# Patient Record
Sex: MALE | Race: WHITE | NOT HISPANIC OR LATINO | Employment: OTHER | URBAN - METROPOLITAN AREA
[De-identification: names, ages, dates, MRNs, and addresses within clinical notes are randomized per-mention and may not be internally consistent; named-entity substitution may affect disease eponyms.]

---

## 2020-02-29 ENCOUNTER — HOSPITAL ENCOUNTER (EMERGENCY)
Facility: HOSPITAL | Age: 85
Discharge: ANOTHER HOSPITAL | End: 2020-02-29
Attending: EMERGENCY MEDICINE
Payer: MEDICARE

## 2020-02-29 ENCOUNTER — OFFICE VISIT (OUTPATIENT)
Dept: URGENT CARE | Facility: CLINIC | Age: 85
End: 2020-02-29
Payer: MEDICARE

## 2020-02-29 ENCOUNTER — HOSPITAL ENCOUNTER (OUTPATIENT)
Facility: HOSPITAL | Age: 85
LOS: 1 days | Discharge: HOME | End: 2020-03-03
Attending: HOSPITALIST | Admitting: HOSPITALIST
Payer: MEDICARE

## 2020-02-29 VITALS
SYSTOLIC BLOOD PRESSURE: 138 MMHG | OXYGEN SATURATION: 97 % | HEART RATE: 62 BPM | BODY MASS INDEX: 30.31 KG/M2 | DIASTOLIC BLOOD PRESSURE: 72 MMHG | WEIGHT: 200 LBS | TEMPERATURE: 98 F | HEIGHT: 68 IN | RESPIRATION RATE: 18 BRPM

## 2020-02-29 VITALS
BODY MASS INDEX: 30.31 KG/M2 | WEIGHT: 200 LBS | RESPIRATION RATE: 18 BRPM | TEMPERATURE: 99 F | HEART RATE: 68 BPM | DIASTOLIC BLOOD PRESSURE: 64 MMHG | HEIGHT: 68 IN | OXYGEN SATURATION: 99 % | SYSTOLIC BLOOD PRESSURE: 165 MMHG

## 2020-02-29 DIAGNOSIS — I63.9 STROKE: ICD-10-CM

## 2020-02-29 DIAGNOSIS — R29.818 TRANSIENT NEUROLOGICAL SYMPTOMS: ICD-10-CM

## 2020-02-29 DIAGNOSIS — R41.0 TRANSIENT DISORIENTATION: ICD-10-CM

## 2020-02-29 DIAGNOSIS — R29.90 STROKE-LIKE SYMPTOMS: ICD-10-CM

## 2020-02-29 DIAGNOSIS — G45.9 TIA (TRANSIENT ISCHEMIC ATTACK): ICD-10-CM

## 2020-02-29 DIAGNOSIS — R47.81 SLURRED SPEECH: Primary | ICD-10-CM

## 2020-02-29 DIAGNOSIS — R53.1 WEAKNESS: ICD-10-CM

## 2020-02-29 PROBLEM — I10 HYPERTENSION, BENIGN: Status: ACTIVE | Noted: 2020-02-29

## 2020-02-29 LAB
ALBUMIN SERPL BCP-MCNC: 3.6 G/DL (ref 3.5–5.2)
ALP SERPL-CCNC: 81 U/L (ref 55–135)
ALT SERPL W/O P-5'-P-CCNC: 13 U/L (ref 10–44)
ANION GAP SERPL CALC-SCNC: 8 MMOL/L (ref 8–16)
APTT BLDCRRT: 30.5 SEC (ref 21–32)
AST SERPL-CCNC: 19 U/L (ref 10–40)
BASOPHILS # BLD AUTO: 0.02 K/UL (ref 0–0.2)
BASOPHILS NFR BLD: 0.4 % (ref 0–1.9)
BILIRUB SERPL-MCNC: 1.3 MG/DL (ref 0.1–1)
BILIRUB UR QL STRIP: NEGATIVE
BNP SERPL-MCNC: 137 PG/ML (ref 0–99)
BUN SERPL-MCNC: 17 MG/DL (ref 8–23)
CALCIUM SERPL-MCNC: 9.3 MG/DL (ref 8.7–10.5)
CHLORIDE SERPL-SCNC: 103 MMOL/L (ref 95–110)
CHOLEST SERPL-MCNC: 166 MG/DL (ref 120–199)
CHOLEST/HDLC SERPL: 4.5 {RATIO} (ref 2–5)
CK MB SERPL-MCNC: 1.4 NG/ML (ref 0.1–6.5)
CK MB SERPL-RTO: 1.5 % (ref 0–5)
CK SERPL-CCNC: 91 U/L (ref 20–200)
CK SERPL-CCNC: 91 U/L (ref 20–200)
CLARITY UR: CLEAR
CO2 SERPL-SCNC: 30 MMOL/L (ref 23–29)
COLOR UR: YELLOW
CREAT SERPL-MCNC: 0.9 MG/DL (ref 0.5–1.4)
DIFFERENTIAL METHOD: ABNORMAL
EOSINOPHIL # BLD AUTO: 0 K/UL (ref 0–0.5)
EOSINOPHIL NFR BLD: 0.4 % (ref 0–8)
ERYTHROCYTE [DISTWIDTH] IN BLOOD BY AUTOMATED COUNT: 12.7 % (ref 11.5–14.5)
EST. GFR  (AFRICAN AMERICAN): >60 ML/MIN/1.73 M^2
EST. GFR  (NON AFRICAN AMERICAN): >60 ML/MIN/1.73 M^2
ESTIMATED AVG GLUCOSE: 120 MG/DL (ref 68–131)
GLUCOSE SERPL-MCNC: 114 MG/DL (ref 70–110)
GLUCOSE UR QL STRIP: NEGATIVE
HBA1C MFR BLD HPLC: 5.8 % (ref 4–5.6)
HCT VFR BLD AUTO: 45.7 % (ref 40–54)
HDLC SERPL-MCNC: 37 MG/DL (ref 40–75)
HDLC SERPL: 22.3 % (ref 20–50)
HGB BLD-MCNC: 14.9 G/DL (ref 14–18)
HGB UR QL STRIP: NEGATIVE
IMM GRANULOCYTES # BLD AUTO: 0.01 K/UL (ref 0–0.04)
IMM GRANULOCYTES NFR BLD AUTO: 0.2 % (ref 0–0.5)
INFLUENZA A, MOLECULAR: NEGATIVE
INFLUENZA B, MOLECULAR: NEGATIVE
INR PPP: 1 (ref 0.8–1.2)
KETONES UR QL STRIP: NEGATIVE
LDLC SERPL CALC-MCNC: 96.8 MG/DL (ref 63–159)
LEUKOCYTE ESTERASE UR QL STRIP: NEGATIVE
LYMPHOCYTES # BLD AUTO: 0.7 K/UL (ref 1–4.8)
LYMPHOCYTES NFR BLD: 15.5 % (ref 18–48)
MAGNESIUM SERPL-MCNC: 1.9 MG/DL (ref 1.6–2.6)
MCH RBC QN AUTO: 28.8 PG (ref 27–31)
MCHC RBC AUTO-ENTMCNC: 32.6 G/DL (ref 32–36)
MCV RBC AUTO: 88 FL (ref 82–98)
MONOCYTES # BLD AUTO: 0.7 K/UL (ref 0.3–1)
MONOCYTES NFR BLD: 13.7 % (ref 4–15)
NEUTROPHILS # BLD AUTO: 3.3 K/UL (ref 1.8–7.7)
NEUTROPHILS NFR BLD: 69.8 % (ref 38–73)
NITRITE UR QL STRIP: NEGATIVE
NONHDLC SERPL-MCNC: 129 MG/DL
NRBC BLD-RTO: 0 /100 WBC
PH UR STRIP: 6 [PH] (ref 5–8)
PHOSPHATE SERPL-MCNC: 2.5 MG/DL (ref 2.7–4.5)
PLATELET # BLD AUTO: 151 K/UL (ref 150–350)
PMV BLD AUTO: 10.7 FL (ref 9.2–12.9)
POTASSIUM SERPL-SCNC: 4.2 MMOL/L (ref 3.5–5.1)
PROT SERPL-MCNC: 7.4 G/DL (ref 6–8.4)
PROT UR QL STRIP: NEGATIVE
PROTHROMBIN TIME: 10.9 SEC (ref 9–12.5)
RBC # BLD AUTO: 5.17 M/UL (ref 4.6–6.2)
SODIUM SERPL-SCNC: 141 MMOL/L (ref 136–145)
SP GR UR STRIP: 1.02 (ref 1–1.03)
SPECIMEN SOURCE: NORMAL
TRIGL SERPL-MCNC: 161 MG/DL (ref 30–150)
TROPONIN I SERPL DL<=0.01 NG/ML-MCNC: 0.02 NG/ML (ref 0–0.03)
TSH SERPL DL<=0.005 MIU/L-ACNC: 1.94 UIU/ML (ref 0.4–4)
URN SPEC COLLECT METH UR: NORMAL
UROBILINOGEN UR STRIP-ACNC: NEGATIVE EU/DL
WBC # BLD AUTO: 4.76 K/UL (ref 3.9–12.7)

## 2020-02-29 PROCEDURE — 85610 PROTHROMBIN TIME: CPT

## 2020-02-29 PROCEDURE — 93005 ELECTROCARDIOGRAM TRACING: CPT

## 2020-02-29 PROCEDURE — 81003 URINALYSIS AUTO W/O SCOPE: CPT

## 2020-02-29 PROCEDURE — 86592 SYPHILIS TEST NON-TREP QUAL: CPT

## 2020-02-29 PROCEDURE — 82553 CREATINE MB FRACTION: CPT

## 2020-02-29 PROCEDURE — 93010 EKG 12-LEAD: ICD-10-PCS | Mod: ,,, | Performed by: INTERNAL MEDICINE

## 2020-02-29 PROCEDURE — 85025 COMPLETE CBC W/AUTO DIFF WBC: CPT

## 2020-02-29 PROCEDURE — 99285 EMERGENCY DEPT VISIT HI MDM: CPT | Mod: 25

## 2020-02-29 PROCEDURE — G0426 PR INPT TELEHEALTH CONSULT 50M: ICD-10-PCS | Mod: 95,,, | Performed by: PSYCHIATRY & NEUROLOGY

## 2020-02-29 PROCEDURE — 82550 ASSAY OF CK (CPK): CPT

## 2020-02-29 PROCEDURE — G0379 DIRECT REFER HOSPITAL OBSERV: HCPCS

## 2020-02-29 PROCEDURE — 83735 ASSAY OF MAGNESIUM: CPT

## 2020-02-29 PROCEDURE — 99203 OFFICE O/P NEW LOW 30 MIN: CPT | Mod: S$GLB,,, | Performed by: PHYSICIAN ASSISTANT

## 2020-02-29 PROCEDURE — 84484 ASSAY OF TROPONIN QUANT: CPT

## 2020-02-29 PROCEDURE — 96372 THER/PROPH/DIAG INJ SC/IM: CPT | Mod: 59 | Performed by: HOSPITALIST

## 2020-02-29 PROCEDURE — 83880 ASSAY OF NATRIURETIC PEPTIDE: CPT

## 2020-02-29 PROCEDURE — 85730 THROMBOPLASTIN TIME PARTIAL: CPT

## 2020-02-29 PROCEDURE — 84443 ASSAY THYROID STIM HORMONE: CPT

## 2020-02-29 PROCEDURE — 82607 VITAMIN B-12: CPT

## 2020-02-29 PROCEDURE — 83036 HEMOGLOBIN GLYCOSYLATED A1C: CPT

## 2020-02-29 PROCEDURE — 99203 PR OFFICE/OUTPT VISIT, NEW, LEVL III, 30-44 MIN: ICD-10-PCS | Mod: S$GLB,,, | Performed by: PHYSICIAN ASSISTANT

## 2020-02-29 PROCEDURE — G0378 HOSPITAL OBSERVATION PER HR: HCPCS

## 2020-02-29 PROCEDURE — 80053 COMPREHEN METABOLIC PANEL: CPT

## 2020-02-29 PROCEDURE — 25000003 PHARM REV CODE 250: Performed by: NURSE PRACTITIONER

## 2020-02-29 PROCEDURE — 94761 N-INVAS EAR/PLS OXIMETRY MLT: CPT

## 2020-02-29 PROCEDURE — 84100 ASSAY OF PHOSPHORUS: CPT

## 2020-02-29 PROCEDURE — G0426 INPT/ED TELECONSULT50: HCPCS | Mod: 95,,, | Performed by: PSYCHIATRY & NEUROLOGY

## 2020-02-29 PROCEDURE — 36415 COLL VENOUS BLD VENIPUNCTURE: CPT

## 2020-02-29 PROCEDURE — 63600175 PHARM REV CODE 636 W HCPCS: Performed by: HOSPITALIST

## 2020-02-29 PROCEDURE — 93010 ELECTROCARDIOGRAM REPORT: CPT | Mod: ,,, | Performed by: INTERNAL MEDICINE

## 2020-02-29 PROCEDURE — 82746 ASSAY OF FOLIC ACID SERUM: CPT

## 2020-02-29 PROCEDURE — 80061 LIPID PANEL: CPT

## 2020-02-29 PROCEDURE — 87502 INFLUENZA DNA AMP PROBE: CPT

## 2020-02-29 RX ORDER — LOPERAMIDE HYDROCHLORIDE 2 MG/1
4 CAPSULE ORAL ONCE
Status: DISCONTINUED | OUTPATIENT
Start: 2020-02-29 | End: 2020-02-29

## 2020-02-29 RX ORDER — ACETAMINOPHEN 325 MG/1
650 TABLET ORAL EVERY 6 HOURS PRN
Status: DISCONTINUED | OUTPATIENT
Start: 2020-02-29 | End: 2020-03-03 | Stop reason: HOSPADM

## 2020-02-29 RX ORDER — LABETALOL HYDROCHLORIDE 5 MG/ML
10 INJECTION, SOLUTION INTRAVENOUS EVERY 6 HOURS PRN
Status: DISCONTINUED | OUTPATIENT
Start: 2020-02-29 | End: 2020-03-03 | Stop reason: HOSPADM

## 2020-02-29 RX ORDER — ATORVASTATIN CALCIUM 40 MG/1
40 TABLET, FILM COATED ORAL DAILY
Status: DISCONTINUED | OUTPATIENT
Start: 2020-03-01 | End: 2020-02-29

## 2020-02-29 RX ORDER — METOPROLOL TARTRATE 1 MG/ML
5 INJECTION, SOLUTION INTRAVENOUS EVERY 5 MIN PRN
Status: DISCONTINUED | OUTPATIENT
Start: 2020-02-29 | End: 2020-02-29

## 2020-02-29 RX ORDER — SODIUM CHLORIDE 0.9 % (FLUSH) 0.9 %
10 SYRINGE (ML) INJECTION
Status: DISCONTINUED | OUTPATIENT
Start: 2020-02-29 | End: 2020-03-03 | Stop reason: HOSPADM

## 2020-02-29 RX ORDER — ENOXAPARIN SODIUM 100 MG/ML
40 INJECTION SUBCUTANEOUS EVERY 24 HOURS
Status: DISCONTINUED | OUTPATIENT
Start: 2020-02-29 | End: 2020-03-03 | Stop reason: HOSPADM

## 2020-02-29 RX ORDER — ROSUVASTATIN CALCIUM 10 MG/1
20 TABLET, COATED ORAL DAILY
Status: DISCONTINUED | OUTPATIENT
Start: 2020-03-01 | End: 2020-02-29

## 2020-02-29 RX ORDER — DOCUSATE SODIUM 100 MG/1
100 CAPSULE, LIQUID FILLED ORAL DAILY
Status: DISCONTINUED | OUTPATIENT
Start: 2020-03-01 | End: 2020-03-03 | Stop reason: HOSPADM

## 2020-02-29 RX ORDER — PANTOPRAZOLE SODIUM 40 MG/1
40 TABLET, DELAYED RELEASE ORAL DAILY
Status: DISCONTINUED | OUTPATIENT
Start: 2020-03-01 | End: 2020-03-03 | Stop reason: HOSPADM

## 2020-02-29 RX ORDER — ATENOLOL 50 MG/1
50 TABLET ORAL DAILY
COMMUNITY
Start: 2019-11-26

## 2020-02-29 RX ORDER — ASPIRIN 81 MG/1
81 TABLET ORAL DAILY
Status: DISCONTINUED | OUTPATIENT
Start: 2020-03-01 | End: 2020-02-29 | Stop reason: SDUPTHER

## 2020-02-29 RX ORDER — ASPIRIN 81 MG/1
81 TABLET ORAL DAILY
Status: DISCONTINUED | OUTPATIENT
Start: 2020-03-01 | End: 2020-03-03 | Stop reason: HOSPADM

## 2020-02-29 RX ORDER — ONDANSETRON 2 MG/ML
4 INJECTION INTRAMUSCULAR; INTRAVENOUS EVERY 8 HOURS PRN
Status: DISCONTINUED | OUTPATIENT
Start: 2020-02-29 | End: 2020-03-03 | Stop reason: HOSPADM

## 2020-02-29 RX ORDER — NAPROXEN SODIUM 220 MG/1
81 TABLET, FILM COATED ORAL
Status: COMPLETED | OUTPATIENT
Start: 2020-02-29 | End: 2020-02-29

## 2020-02-29 RX ORDER — POLYETHYLENE GLYCOL 3350 17 G/17G
17 POWDER, FOR SOLUTION ORAL DAILY
Status: DISCONTINUED | OUTPATIENT
Start: 2020-03-01 | End: 2020-03-03 | Stop reason: HOSPADM

## 2020-02-29 RX ORDER — CLOPIDOGREL BISULFATE 75 MG/1
75 TABLET ORAL DAILY
Status: DISCONTINUED | OUTPATIENT
Start: 2020-03-01 | End: 2020-02-29

## 2020-02-29 RX ORDER — DOCUSATE SODIUM 100 MG/1
100 CAPSULE, LIQUID FILLED ORAL DAILY
COMMUNITY

## 2020-02-29 RX ADMIN — ASPIRIN 81 MG 81 MG: 81 TABLET ORAL at 01:02

## 2020-02-29 RX ADMIN — ENOXAPARIN SODIUM 40 MG: 100 INJECTION SUBCUTANEOUS at 09:02

## 2020-02-29 RX ADMIN — ACETAMINOPHEN 650 MG: 325 TABLET ORAL at 09:02

## 2020-02-29 NOTE — ED PROVIDER NOTES
Encounter Date: 2/29/2020       History     Chief Complaint   Patient presents with    bout of slurred speech     Papo Chopra is a 84 y.o. male with PMH of hypertension who was referred by urgent care for evaluation of slurred speech, possible TIA.  Patient presents with daughter who reports that she initially noticed symptoms yesterday. She reports weakness, disorientation yesterday that resolved. He awoke this AM at approximately 0730 with noticeable slurring of speech and disorientation per daughter reports lasting approximately 3 min.; he then fell asleep again in which she woke him up and noticed that he still had slurring of speech. She brought him to urgent care to be evaluated for a possible stroke because patient refused to come to the ED. He presented to urgent care with completely resolved symptoms; after eval he was immediately referred to the ED for possible stroke vs TIA.   He presents to the ED without symptoms, normal neuro exam. He reports generalized weakness yesterday; denies headache, dizziness, lightheadedness, or facial tingling.  He denies chest pain or shortness of breath.  He denies heart palpitations or feelings of syncope.  He does not endorse fever, chills, or body aches.  He denies cough or recent URI.  He denies urinary symptoms.    The history is provided by the patient.     Review of patient's allergies indicates:  No Known Allergies  Past Medical History:   Diagnosis Date    Hypertension      History reviewed. No pertinent surgical history.  Family History   Problem Relation Age of Onset    No Known Problems Mother     No Known Problems Father      Social History     Tobacco Use    Smoking status: Never Smoker   Substance Use Topics    Alcohol use: Not Currently    Drug use: Never     Review of Systems   Constitutional: Negative.  Negative for appetite change, chills and fever.   HENT: Negative.  Negative for congestion, ear discharge, ear pain, postnasal drip,  rhinorrhea and sore throat.    Eyes: Negative.    Respiratory: Negative.  Negative for cough, chest tightness and shortness of breath.    Cardiovascular: Negative.  Negative for chest pain.   Gastrointestinal: Negative.  Negative for abdominal distention, abdominal pain and nausea.   Endocrine: Negative.    Genitourinary: Negative.  Negative for dysuria, flank pain, hematuria and urgency.   Musculoskeletal: Negative.  Negative for arthralgias and back pain.   Skin: Negative.  Negative for rash.   Allergic/Immunologic: Negative.    Neurological: Positive for speech difficulty (hx of slurred speech; no resolved) and weakness. Negative for dizziness, tremors, seizures, syncope, facial asymmetry, light-headedness, numbness and headaches.   Hematological: Negative.  Does not bruise/bleed easily.   Psychiatric/Behavioral: Positive for confusion (disorientation per daughter report; no resolved).       Physical Exam     Initial Vitals [02/29/20 1030]   BP Pulse Resp Temp SpO2   (!) 160/68 64 16 98 °F (36.7 °C) 98 %      MAP       --         Physical Exam    Nursing note and vitals reviewed.  Constitutional: He appears well-developed and well-nourished.   HENT:   Head: Normocephalic and atraumatic.   Right Ear: External ear normal.   Left Ear: External ear normal.   Nose: Nose normal.   Mouth/Throat: Oropharynx is clear and moist. No oropharyngeal exudate.   Eyes: Conjunctivae and EOM are normal. Pupils are equal, round, and reactive to light.   Neck: Normal range of motion. Neck supple.   Cardiovascular: Normal rate, regular rhythm, normal heart sounds and intact distal pulses.   Pulmonary/Chest: Breath sounds normal.   Abdominal: Soft. Bowel sounds are normal.   Musculoskeletal: Normal range of motion.   Neurological: He is alert and oriented to person, place, and time. He has normal strength and normal reflexes. No cranial nerve deficit or sensory deficit. GCS eye subscore is 4. GCS verbal subscore is 5. GCS motor  subscore is 6.   Skin: Skin is warm and dry.   Psychiatric: He has a normal mood and affect. His behavior is normal. Judgment and thought content normal.         ED Course   Procedures  Labs Reviewed   CBC W/ AUTO DIFFERENTIAL - Abnormal; Notable for the following components:       Result Value    Lymph # 0.7 (*)     Lymph% 15.5 (*)     All other components within normal limits   COMPREHENSIVE METABOLIC PANEL - Abnormal; Notable for the following components:    CO2 30 (*)     Glucose 114 (*)     Total Bilirubin 1.3 (*)     All other components within normal limits   B-TYPE NATRIURETIC PEPTIDE - Abnormal; Notable for the following components:     (*)     All other components within normal limits   PHOSPHORUS - Abnormal; Notable for the following components:    Phosphorus 2.5 (*)     All other components within normal limits   INFLUENZA A & B BY MOLECULAR   URINALYSIS, REFLEX TO URINE CULTURE    Narrative:     Preferred Collection Type->Urine, Clean Catch   PROTIME-INR   TSH   APTT   TROPONIN I   CK   CK-MB   MAGNESIUM   POCT GLUCOSE, HAND-HELD DEVICE          Imaging Results          X-Ray Chest 1 View (Final result)  Result time 02/29/20 11:12:42   Procedure changed from X-Ray Chest PA And Lateral     Final result by Balaji Simpson MD (02/29/20 11:12:42)                 Impression:      Negative chest.  No significant change.      Electronically signed by: Balaji Simpson MD  Date:    02/29/2020  Time:    11:12             Narrative:    EXAMINATION:  XR CHEST 1 VIEW    CLINICAL HISTORY:  possible stroke; Weakness    TECHNIQUE:  Single frontal view of the chest was performed.    COMPARISON:  12/03/2003    FINDINGS:  The cardiomediastinal silhouette is within normal limits.  The lungs are well expanded without consolidation or pleural effusion.                               CT Head Without Contrast (Final result)  Result time 02/29/20 10:53:59    Final result by Balaji Simpson MD (02/29/20  10:53:59)                 Impression:      No acute intracranial process.      Electronically signed by: Balaji Simpson MD  Date:    02/29/2020  Time:    10:53             Narrative:    EXAMINATION:  CT HEAD WITHOUT CONTRAST    CLINICAL HISTORY:  Confusion/delirium, altered LOC, unexplained;Stroke;    TECHNIQUE:  Low dose axial images were obtained through the head.  Coronal and sagittal reformations were also performed. Contrast was not administered.    COMPARISON:  None.    FINDINGS:  Gray-white differentiation is well maintained.  There is no intracranial hemorrhage.  There is no mass or midline shift.  Mild generalized cerebral volume loss is present.  Moderate calcification of the intracranial ICAs is present.  The ventricles are nondilated.  The calvarium is intact.                                                      Critical Care ED Physician Time (minutes):  -- Performed by: Kamaljit Martini M.D.  -- Date/Time: 12:32 PM 2/29/2020   -- Direct Patient Care (Face Time): 5  -- Additional History from Records or Taking Additional History: 5  -- Ordering, Reviewing, and Interpreting Diagnostic Studies: 5  -- Total Time in Documentation: 5  -- Consultation with Other Physicians: 5  -- Consultation with Family Related to Condition: 5  -- Total Critical Care Time: 30            Clinical Impression:       ICD-10-CM ICD-9-CM   1. Stroke I63.9 434.91   2. Weakness R53.1 780.79             ED Disposition Condition    Transfer to Another Facility Stable           I took over this patient from the nurse practitioner this afternoon  Patient had episode of slurred speech with a negative metabolic workup  Vascular Neurology consult performed in the emergency room today  All metabolic forms of slurred speech have been ruled out the emergency room  Patient be transferred for TIA workup including MRI and CTA head neck               Kamaljit Martini MD  02/29/20 [id number]

## 2020-02-29 NOTE — HPI
84M woken up by his daughter this Am with slurred speech and disorientation, went back to sleep, woke up 10 minutes later, then woke up again and was wobbly on his feet. Doesn't remember going to urgent care. He woke at 0730 with these symptoms. Yesterday he was disoriented and not feeling well. The aforementioned symptoms have never happened before. There are no identified triggers or modifying factors. There have been no recurrent events. There are no other associated symptoms.

## 2020-02-29 NOTE — ED TRIAGE NOTES
Daughter stated pt was not feeling well yesterday. Had a short bout of confusion yesterday and this morning approx 8am had another short bout of confusion and slurred speech. Upon arriving to er, pt alert and oriented. No slurred speech or confusion. No neuro deficits at this time.

## 2020-02-29 NOTE — SUBJECTIVE & OBJECTIVE
"  Woke up with symptoms?: yes    Recent bleeding noted: no  Does the patient take any Blood Thinners? no  Medications: No relevant medications      Past Medical History: hypertension    Past Surgical History: no major surgeries within the last 2 weeks    Family History: no relevant history    Social History: no smoking, no drinking, no drugs    Allergies: No Known Allergies No relevant allergies    Review of Systems   Constitutional: Negative for appetite change, chills and fever.   HENT: Negative for congestion and sore throat.    Eyes: Negative for discharge and itching.   Respiratory: Negative for apnea and shortness of breath.    Cardiovascular: Negative for chest pain and palpitations.   Gastrointestinal: Negative for abdominal pain and anal bleeding.   Endocrine: Negative for cold intolerance and polydipsia.   Genitourinary: Negative for dysuria and hematuria.   Musculoskeletal: Negative for joint swelling and myalgias.   Skin: Negative for color change and rash.   Neurological: Negative for tremors.   Psychiatric/Behavioral: Negative for hallucinations and self-injury.     Objective:   Vitals: Blood pressure (!) 160/68, pulse 64, temperature 98 °F (36.7 °C), temperature source Oral, resp. rate 16, height 5' 8" (1.727 m), weight 90.7 kg (200 lb), SpO2 98 %.     CT READ: Yes  No hemmorhage. No mass effect. No early infarct signs.     Physical Exam   Constitutional: He appears well-nourished. No distress.   HENT:   Head: Atraumatic.   Right Ear: External ear normal.   Left Ear: External ear normal.   Eyes: Conjunctivae are normal. No scleral icterus.   Neck: Normal range of motion.   Pulmonary/Chest: Effort normal.   Abdominal: He exhibits no distension. There is no guarding.   Musculoskeletal: Normal range of motion. He exhibits no deformity.   Neurological: He is alert.   Skin: Skin is warm and dry.   Psychiatric: He has a normal mood and affect.         "

## 2020-02-29 NOTE — CONSULTS
[facility]  Vascular Neurology  Comprehensive Stroke Center  Tele-Consultation Note      Inpatient consult to Telemedicine-Stroke  Consult performed by: Ted Longoria MD  Consult ordered by: Adina Riddle NP          Consulting Provider: NELLY FORD  Current Providers  No providers found    Patient Location:  [facility] EMERGENCY DEPARTMENT Emergency Department  Spoke hospital nurse at bedside with patient assisting consultant.     Patient information was obtained from patient and relative(s).         Assessment/Plan:     STROKE DOCUMENTATION     Acute Stroke Times:   Acute Stroke Times   Last Known Normal Date: 02/28/20  Last Known Normal Time: 1930  Stroke Team Called Time: 1049  Stroke Team Arrival Time: 1051  CT Interpretation Time: 1051    NIH Scale:  1a. Level of Consciousness: 0-->Alert, keenly responsive  1b. LOC Questions: 0-->Answers both questions correctly  1c. LOC Commands: 0-->Performs both tasks correctly  2. Best Gaze: 0-->Normal  3. Visual: 0-->No visual loss  4. Facial Palsy: 0-->Normal symmetrical movements  5a. Motor Arm, Left: 0-->No drift, limb holds 90 (or 45) degrees for full 10 secs  5b. Motor Arm, Right: 0-->No drift, limb holds 90 (or 45) degrees for full 10 secs  6a. Motor Leg, Left: 0-->No drift, leg holds 30 degree position for full 5 secs  6b. Motor Leg, Right: 0-->No drift, leg holds 30 degree position for full 5 secs  7. Limb Ataxia: 0-->Absent  8. Sensory: 0-->Normal, no sensory loss  9. Best Language: 0-->No aphasia, normal  10. Dysarthria: 0-->Normal  11. Extinction and Inattention (formerly Neglect): 0-->No abnormality  Total (NIH Stroke Scale): 0     Modified [place] Coma Scale:    ABCD2 Score:    [id number] Score:   HAS -BLED Score:   ICH Score:   Hunt & Grover Classification:       Diagnoses:   Transient neurological symptoms  Currently nonfocal on examination. Poor recollection of some events suggests alternative etiology  "rather than vascular. Symptoms seem to have started at some point yesterday evening with feeling cold and fatigue and confusion.   Recommend workup for infection/metabolic disturbances. If no e/o of underlying condition, vessel imaging with CTA or MRA can be considered to r/o culprit of atheroembolism or focal hypoperfusion.  Recommend aspirin 81 mg until workup completed.        Blood pressure (!) 160/68, pulse 64, temperature 98 °F (36.7 °C), temperature source Oral, resp. rate 16, height 5' 8" (1.727 m), weight 90.7 kg (200 lb), SpO2 98 %.  Alteplase Eligible?: No  Alteplase Recommendation: Alteplase not recommended due to Symptoms resolved   Possible Interventional Revascularization Candidate? No; No significant neurological deficit    Disposition Recommendation: admit to inpatient    Subjective:     History of Present Illness:  84M woken up by his daughter this Am with slurred speech and disorientation, went back to sleep, woke up 10 minutes later, then woke up again and was wobbly on his feet. Doesn't remember going to urgent care. He woke at 0730 with these symptoms. Yesterday he was disoriented and not feeling well. The aforementioned symptoms have never happened before. There are no identified triggers or modifying factors. There have been no recurrent events. There are no other associated symptoms.          Woke up with symptoms?: yes    Recent bleeding noted: no  Does the patient take any Blood Thinners? no  Medications: No relevant medications      Past Medical History: hypertension    Past Surgical History: no major surgeries within the last 2 weeks    Family History: no relevant history    Social History: no smoking, no drinking, no drugs    Allergies: No Known Allergies No relevant allergies    Review of Systems   Constitutional: Negative for appetite change, chills and fever.   HENT: Negative for congestion and sore throat.    Eyes: Negative for discharge and itching.   Respiratory: Negative for " "apnea and shortness of breath.    Cardiovascular: Negative for chest pain and palpitations.   Gastrointestinal: Negative for abdominal pain and anal bleeding.   Endocrine: Negative for cold intolerance and polydipsia.   Genitourinary: Negative for dysuria and hematuria.   Musculoskeletal: Negative for joint swelling and myalgias.   Skin: Negative for color change and rash.   Neurological: Negative for tremors.   Psychiatric/Behavioral: Negative for hallucinations and self-injury.     Objective:   Vitals: Blood pressure (!) 160/68, pulse 64, temperature 98 °F (36.7 °C), temperature source Oral, resp. rate 16, height 5' 8" (1.727 m), weight 90.7 kg (200 lb), SpO2 98 %.     CT READ: Yes  No hemmorhage. No mass effect. No early infarct signs.     Physical Exam   Constitutional: He appears well-nourished. No distress.   HENT:   Head: Atraumatic.   Right Ear: External ear normal.   Left Ear: External ear normal.   Eyes: Conjunctivae are normal. No scleral icterus.   Neck: Normal range of motion.   Pulmonary/Chest: Effort normal.   Abdominal: He exhibits no distension. There is no guarding.   Musculoskeletal: Normal range of motion. He exhibits no deformity.   Neurological: He is alert.   Skin: Skin is warm and dry.   Psychiatric: He has a normal mood and affect.             Recommended the emergency room physician to have a brief discussion with the patient and/or family if available regarding the risks and benefits of treatment, and to briefly document the occurrence of that discussion in his clinical encounter note.     The attending portion of this evaluation, treatment, and documentation was performed per Ted Longoria MD via audiovisual.    Billing code:  (non-intervention mild to moderate stroke, TIA, some mimics)    · This patient has a critical neurological condition/illness, with some potential for high morbidity and mortality.  · There is a moderate probability for acute neurological change leading to " clinical and possibly life-threatening deterioration requiring highest level of physician preparedness for urgent intervention.  · Care was coordinated with other physicians involved in the patient's care.  · Radiologic studies and laboratory data were reviewed and interpreted, and plan of care was re-assessed based on the results.  · Diagnosis, treatment options and prognosis may have been discussed with the patient and/or family members or caregiver.      In your opinion, this was a: Tier 2 Van Negative    Consult End Time: 11:00 AM     Ted Longoria MD  [facility] Stroke Center  Vascular Neurology   [facility]

## 2020-02-29 NOTE — PROGRESS NOTES
"Subjective:       Patient ID: Papo Chopra is a 84 y.o. male.    Vitals:  height is 5' 8" (1.727 m) and weight is 90.7 kg (200 lb). His oral temperature is 98.7 °F (37.1 °C). His blood pressure is 165/64 (abnormal) and his pulse is 68. His respiration is 18 and oxygen saturation is 99%.     Chief Complaint: Possible stroke    84-year-old male brought to clinic today by his daughter who states that she feels he is having a stroke.  Daughter states that patient woke at approximately 7:30 a.m. or 8:00 a.m. this morning and had slurred speech and disorientation.  She states that these symptoms lasted for approximately 3 min before patient went back to sleep.  Daughter states that she woke patient again after approximately 8 min and he was still slightly disoriented.  Daughter states that patient is no longer slurring his speech or disoriented.  She states that patient was refusing to go to the emergency department; therefore, she brought him here for evaluation.  Patient denies any complaints at this time.    Other   This is a new (Slurred speech, disorientation) problem. The current episode started today. The problem occurs intermittently. The problem has been resolved. Pertinent negatives include no abdominal pain, anorexia, arthralgias, change in bowel habit, chest pain, chills, congestion, coughing, diaphoresis, fatigue, fever, headaches, joint swelling, myalgias, nausea, neck pain, numbness, rash, sore throat, swollen glands, urinary symptoms, vertigo, visual change, vomiting or weakness. Nothing aggravates the symptoms. He has tried nothing for the symptoms.       Constitution: Negative for chills, sweating, fatigue and fever.   HENT: Negative for congestion and sore throat.    Neck: Negative for neck pain.   Cardiovascular: Negative for chest pain.   Respiratory: Negative for cough.    Gastrointestinal: Negative for abdominal pain, nausea, vomiting and diarrhea.   Musculoskeletal: Negative for joint pain, " joint swelling and muscle ache.   Skin: Negative for rash.   Neurological: Positive for speech difficulty (slurred speech) and disorientation. Negative for history of vertigo, headaches and numbness.   Psychiatric/Behavioral: Positive for disorientation. Negative for hallucinations and history of mental illness.       Objective:      Physical Exam   Constitutional: He is oriented to person, place, and time. He appears well-developed and well-nourished.  Non-toxic appearance. He does not appear ill. No distress.   HENT:   Head: Normocephalic and atraumatic.   Right Ear: Hearing, tympanic membrane, external ear and ear canal normal.   Left Ear: Hearing, tympanic membrane, external ear and ear canal normal.   Nose: Nose normal. No mucosal edema, rhinorrhea or nasal deformity. No epistaxis. Right sinus exhibits no maxillary sinus tenderness and no frontal sinus tenderness. Left sinus exhibits no maxillary sinus tenderness and no frontal sinus tenderness.   Mouth/Throat: Uvula is midline, oropharynx is clear and moist and mucous membranes are normal. No trismus in the jaw. Normal dentition. No uvula swelling. No posterior oropharyngeal erythema.   Eyes: Pupils are equal, round, and reactive to light. Conjunctivae, EOM and lids are normal. No scleral icterus.   Neck: Trachea normal, normal range of motion, full passive range of motion without pain and phonation normal. Neck supple. No neck rigidity.   Cardiovascular: Normal rate, regular rhythm, normal heart sounds, intact distal pulses and normal pulses.   Pulmonary/Chest: Effort normal and breath sounds normal. No respiratory distress.   Abdominal: Soft. Normal appearance and bowel sounds are normal. He exhibits no distension. There is no tenderness.   Musculoskeletal: Normal range of motion. He exhibits no edema or deformity.   Neurological: He is alert and oriented to person, place, and time. He has normal strength. He is not disoriented (no disorientation at  present). No cranial nerve deficit. He exhibits normal muscle tone. Coordination normal. GCS eye subscore is 4. GCS verbal subscore is 5. GCS motor subscore is 6.   No pronator drift.   Skin: Skin is warm, dry, intact, not diaphoretic and not pale.   Psychiatric: He has a normal mood and affect. His speech is normal and behavior is normal. Judgment and thought content normal. Cognition and memory are normal.   Nursing note and vitals reviewed.        Assessment:       1. Slurred speech    2. Transient disorientation        Plan:         Slurred speech  -     Refer to Emergency Dept.    Transient disorientation  -     Refer to Emergency Dept.     Patient is AAOX3, and in no acute distress. He has a slightly elevated blood pressure but his vitals are otherwise unremarkable.  His physical exam is unremarkable.  He is neurovascularly intact.  Suspect a possible TIA this AM.  Patient will be sent to the ED for further evaluation at this time.  Daughter states that she will take him there right now.  Patient stable upon discharge.          Patient Instructions   YOUR CONDITION IS POTENTIALLY LIFE THREATENING.  GO TO ER NOW FOR FURTHER EVALUATION AND TREATMENT.    You were offered transfer by ambulance.  You have declined ambulance transport and agree to go to nearest ER by private auto.  The risks of this decision were explained to you.

## 2020-02-29 NOTE — ASSESSMENT & PLAN NOTE
Currently nonfocal on examination. Poor recollection of some events suggests alternative etiology rather than vascular. Symptoms seem to have started at some point yesterday evening with feeling cold and fatigue and confusion.   Recommend workup for infection/metabolic disturbances. If no e/o of underlying condition, vessel imaging with CTA or MRA can be considered to r/o culprit of atheroembolism or focal hypoperfusion.  Recommend aspirin 81 mg until workup completed.

## 2020-03-01 LAB
ALBUMIN SERPL BCP-MCNC: 3.1 G/DL (ref 3.5–5.2)
ALP SERPL-CCNC: 69 U/L (ref 55–135)
ALT SERPL W/O P-5'-P-CCNC: 11 U/L (ref 10–44)
ANION GAP SERPL CALC-SCNC: 6 MMOL/L (ref 8–16)
APTT BLDCRRT: 32.7 SEC (ref 21–32)
AST SERPL-CCNC: 20 U/L (ref 10–40)
BASOPHILS # BLD AUTO: 0.02 K/UL (ref 0–0.2)
BASOPHILS NFR BLD: 0.4 % (ref 0–1.9)
BILIRUB SERPL-MCNC: 0.9 MG/DL (ref 0.1–1)
BUN SERPL-MCNC: 16 MG/DL (ref 8–23)
CALCIUM SERPL-MCNC: 8.7 MG/DL (ref 8.7–10.5)
CHLORIDE SERPL-SCNC: 103 MMOL/L (ref 95–110)
CK MB SERPL-MCNC: 1.4 NG/ML (ref 0.1–6.5)
CK MB SERPL-RTO: 1.5 % (ref 0–5)
CK SERPL-CCNC: 94 U/L (ref 20–200)
CO2 SERPL-SCNC: 30 MMOL/L (ref 23–29)
CREAT SERPL-MCNC: 0.9 MG/DL (ref 0.5–1.4)
DIFFERENTIAL METHOD: ABNORMAL
EOSINOPHIL # BLD AUTO: 0.1 K/UL (ref 0–0.5)
EOSINOPHIL NFR BLD: 2.4 % (ref 0–8)
ERYTHROCYTE [DISTWIDTH] IN BLOOD BY AUTOMATED COUNT: 12.7 % (ref 11.5–14.5)
EST. GFR  (AFRICAN AMERICAN): >60 ML/MIN/1.73 M^2
EST. GFR  (NON AFRICAN AMERICAN): >60 ML/MIN/1.73 M^2
FOLATE SERPL-MCNC: 9.8 NG/ML (ref 4–24)
GLUCOSE SERPL-MCNC: 80 MG/DL (ref 70–110)
HCT VFR BLD AUTO: 41.9 % (ref 40–54)
HGB BLD-MCNC: 13.6 G/DL (ref 14–18)
IMM GRANULOCYTES # BLD AUTO: 0.01 K/UL (ref 0–0.04)
IMM GRANULOCYTES NFR BLD AUTO: 0.2 % (ref 0–0.5)
INR PPP: 1 (ref 0.8–1.2)
LYMPHOCYTES # BLD AUTO: 1.6 K/UL (ref 1–4.8)
LYMPHOCYTES NFR BLD: 34.4 % (ref 18–48)
MAGNESIUM SERPL-MCNC: 2 MG/DL (ref 1.6–2.6)
MCH RBC QN AUTO: 28.8 PG (ref 27–31)
MCHC RBC AUTO-ENTMCNC: 32.5 G/DL (ref 32–36)
MCV RBC AUTO: 89 FL (ref 82–98)
MONOCYTES # BLD AUTO: 1 K/UL (ref 0.3–1)
MONOCYTES NFR BLD: 21.1 % (ref 4–15)
NEUTROPHILS # BLD AUTO: 1.9 K/UL (ref 1.8–7.7)
NEUTROPHILS NFR BLD: 41.5 % (ref 38–73)
NRBC BLD-RTO: 0 /100 WBC
PHOSPHATE SERPL-MCNC: 3 MG/DL (ref 2.7–4.5)
PLATELET # BLD AUTO: 142 K/UL (ref 150–350)
PMV BLD AUTO: 11.1 FL (ref 9.2–12.9)
POTASSIUM SERPL-SCNC: 4.3 MMOL/L (ref 3.5–5.1)
PROT SERPL-MCNC: 6.8 G/DL (ref 6–8.4)
PROTHROMBIN TIME: 10.9 SEC (ref 9–12.5)
RBC # BLD AUTO: 4.73 M/UL (ref 4.6–6.2)
RPR SER QL: NORMAL
SODIUM SERPL-SCNC: 139 MMOL/L (ref 136–145)
TROPONIN I SERPL DL<=0.01 NG/ML-MCNC: 0.02 NG/ML (ref 0–0.03)
VIT B12 SERPL-MCNC: 384 PG/ML (ref 210–950)
WBC # BLD AUTO: 4.51 K/UL (ref 3.9–12.7)

## 2020-03-01 PROCEDURE — 25000003 PHARM REV CODE 250: Performed by: HOSPITALIST

## 2020-03-01 PROCEDURE — 84100 ASSAY OF PHOSPHORUS: CPT

## 2020-03-01 PROCEDURE — 82553 CREATINE MB FRACTION: CPT

## 2020-03-01 PROCEDURE — 84484 ASSAY OF TROPONIN QUANT: CPT

## 2020-03-01 PROCEDURE — 82550 ASSAY OF CK (CPK): CPT

## 2020-03-01 PROCEDURE — 83735 ASSAY OF MAGNESIUM: CPT

## 2020-03-01 PROCEDURE — 36415 COLL VENOUS BLD VENIPUNCTURE: CPT

## 2020-03-01 PROCEDURE — 80053 COMPREHEN METABOLIC PANEL: CPT

## 2020-03-01 PROCEDURE — 25500020 PHARM REV CODE 255: Performed by: HOSPITALIST

## 2020-03-01 PROCEDURE — 85025 COMPLETE CBC W/AUTO DIFF WBC: CPT

## 2020-03-01 PROCEDURE — 94761 N-INVAS EAR/PLS OXIMETRY MLT: CPT

## 2020-03-01 PROCEDURE — G0378 HOSPITAL OBSERVATION PER HR: HCPCS

## 2020-03-01 PROCEDURE — 85610 PROTHROMBIN TIME: CPT

## 2020-03-01 PROCEDURE — 92610 EVALUATE SWALLOWING FUNCTION: CPT

## 2020-03-01 PROCEDURE — 85730 THROMBOPLASTIN TIME PARTIAL: CPT

## 2020-03-01 PROCEDURE — 97161 PT EVAL LOW COMPLEX 20 MIN: CPT

## 2020-03-01 PROCEDURE — 97530 THERAPEUTIC ACTIVITIES: CPT

## 2020-03-01 PROCEDURE — 25000003 PHARM REV CODE 250: Performed by: NURSE PRACTITIONER

## 2020-03-01 PROCEDURE — 63600175 PHARM REV CODE 636 W HCPCS: Performed by: HOSPITALIST

## 2020-03-01 PROCEDURE — 97165 OT EVAL LOW COMPLEX 30 MIN: CPT

## 2020-03-01 PROCEDURE — 96372 THER/PROPH/DIAG INJ SC/IM: CPT | Mod: 59 | Performed by: HOSPITALIST

## 2020-03-01 RX ORDER — CLOPIDOGREL BISULFATE 75 MG/1
75 TABLET ORAL DAILY
Status: DISCONTINUED | OUTPATIENT
Start: 2020-03-01 | End: 2020-03-02

## 2020-03-01 RX ORDER — GUAIFENESIN 600 MG/1
600 TABLET, EXTENDED RELEASE ORAL 2 TIMES DAILY
Status: DISCONTINUED | OUTPATIENT
Start: 2020-03-01 | End: 2020-03-02

## 2020-03-01 RX ADMIN — ASPIRIN 81 MG: 81 TABLET, COATED ORAL at 08:03

## 2020-03-01 RX ADMIN — POLYETHYLENE GLYCOL 3350 17 G: 17 POWDER, FOR SOLUTION ORAL at 08:03

## 2020-03-01 RX ADMIN — CLOPIDOGREL BISULFATE 75 MG: 75 TABLET ORAL at 10:03

## 2020-03-01 RX ADMIN — GUAIFENESIN 600 MG: 600 TABLET, EXTENDED RELEASE ORAL at 10:03

## 2020-03-01 RX ADMIN — DOCUSATE SODIUM 100 MG: 100 CAPSULE, LIQUID FILLED ORAL at 08:03

## 2020-03-01 RX ADMIN — ENOXAPARIN SODIUM 40 MG: 100 INJECTION SUBCUTANEOUS at 06:03

## 2020-03-01 RX ADMIN — PANTOPRAZOLE SODIUM 40 MG: 40 TABLET, DELAYED RELEASE ORAL at 08:03

## 2020-03-01 RX ADMIN — IOHEXOL 100 ML: 350 INJECTION, SOLUTION INTRAVENOUS at 06:03

## 2020-03-01 NOTE — PLAN OF CARE
VN note: Dr. Hines informed of neuro recs for CTA head/neck that they want to have done today, per bedside nurse. Dr. Hines adjusted orders for STAT CTA head/neck and STAT MRI - MD informed that he will need to do a direct call from physician to physician for STAT MRI. Charge nurse aware. House sup, aware and confirmed MRI on-call staff has been called.

## 2020-03-01 NOTE — PT/OT/SLP EVAL
Occupational Therapy   Evaluation and Discharge Note    Name: Papo Chopra  MRN: [id number]  Admitting Diagnosis:  Transient neurological symptoms      Recommendations:     Discharge Recommendations: home  Discharge Equipment Recommendations:  none  Barriers to discharge:  None    Assessment:     Papo Chopra is a 84 y.o. male with a medical diagnosis of Transient neurological symptoms. At this time, patient is functioning at their prior level of function and does not require further acute OT services.     Plan:     During this hospitalization, patient does not require further acute OT services.  Please re-consult if situation changes.    · Plan of Care Reviewed with: patient, daughter, son    Subjective     Chief Complaint: none  Patient/Family Comments/goals: wants to get OOB and walk.    Occupational Profile:  Living Environment: Pt lives with wife and daughter in [facility] with TH entry; walk n shower with built n seat, grab bars and raised toilet with grab bars.    Previous level of function: Independent ADLS, ambulation, cares for wife in , assists her with all ADLS and lifting assist transfers; Pt drives and does IADLS.  Roles and Routines: active, likes to fish.  Equipment Used at home:  shower chair, grab bar, raised toilet  Assistance upon Discharge: family and currently in process of getting caregiver assistance for sick wife.    Pain/Comfort:  · Pain Rating 1: 0/10  · Pain Rating Post-Intervention 1: 0/10    Patients cultural, spiritual, Jainism conflicts given the current situation: no    Objective:     Communicated with: nurse prior to session.  Patient found HOB elevated with bed alarm, telemetry upon OT entry to room.    General Precautions: Standard, fall   Orthopedic Precautions:N/A   Braces: N/A     Occupational Performance:    Bed Mobility:    · Patient completed Rolling/Turning to Right with independence  · Patient completed Scooting/Bridging with independence  · Patient completed Supine  to Sit with independence  · Patient completed Sit to Supine with independence    Functional Mobility/Transfers:  · Patient completed Sit <> Stand Transfer with independence  with  no assistive device   · Patient completed Toilet Transfer Step Transfer technique with independence with  no AD  · Functional Mobility: ambulated long distance inn hallway and short distance in room to bathroom , sink and back to bed Independently; no LOB.    Activities of Daily Living:  · Feeding:  independence .  · Grooming: independence .  · Upper Body Dressing: independence .  · Lower Body Dressing: independence .  · Toileting: independence .    Cognitive/Visual Perceptual:  Cognitive/Psychosocial Skills:     -       Oriented to: Person, Place, Time and Situation   -       Follows Commands/attention:Follows two-step commands  -       Communication: clear/fluent  -       Memory: Impaired STM  -       Safety awareness/insight to disability: intact   -       Mood/Affect/Coping skills/emotional control: Appropriate to situation  Visual/Perceptual:      -Intact wears glasses, tracking and visual field intact    Physical Exam:  Balance:    -       sitting:  good  dynamic:  good  standing:  good  dynamic:  good-  Postural examination/scapula alignment:    -       Rounded shoulders  Sensation:    -       Intact grossly; reposts hx tingling in feet and finger tips , finger tips purplish in color with hand hanging down   Motor Planning:    -       intact  Dominant hand:    -       Right    Upper Extremity Range of Motion:   WFL  Upper Extremity Strength: WFL   Strength: WFL  Fine Motor Coordination: {intact  Gross motor coordination: intact  Neurological: normal tone    AMPAC 6 Click ADL:  AMPAC Total Score: 24    Treatment & Education:  -Role of OT and POC explained to pt and family, verbalized understanding.  -OT recommend pt continue to use shower chair for safety . Dc OT residual no deficits noted.  Education:    Patient left HOB  elevated with all lines intact, call button in reach, bed alarm on, family notified and . present    GOALS:   Multidisciplinary Problems     Occupational Therapy Goals     Not on file          Multidisciplinary Problems (Resolved)        Problem: Occupational Therapy Goal    Goal Priority Disciplines Outcome Interventions   Occupational Therapy Goal   (Resolved)     OT, PT/OT Met                    History:     Past Medical History:   Diagnosis Date    Hypertension        History reviewed. No pertinent surgical history.    Time Tracking:     OT Date of Treatment: 03/01/20  OT Start Time: 1527  OT Stop Time: 1557  OT Total Time (min): 30 min COTX with PT    Billable Minutes:Evaluation 15  Total Time 15    Albina Clark OT  3/1/2020

## 2020-03-01 NOTE — ASSESSMENT & PLAN NOTE
TIA (transient ischemic attack)  Slurred speech    -Continuous cardiac monitoring   -Troponin: WNL  -tele vascular consult complete and recommend workup for infection/metabolic disturbances. If no e/o of underlying condition, vessel imaging with CTA or MRA can be considered to r/o culprit of atheroembolism or focal hypoperfusion.Recommend aspirin 81 mg until workup completed.  -CT of head: no acute intra cranial processes  -MRI of head: pending  -MRA of head and Neck: pending  -LDL: 96.8  -U/A: negative for UTI  -TSH: WNL  -B-12, Folate, RPR: pending  -Permissive HTN  -2D echo: pending  -Neuro checks Q4hrs  -PT/OT/SP evaluation

## 2020-03-01 NOTE — SUBJECTIVE & OBJECTIVE
Interval History: CTA neck/head, and MRI stat today,. Added plavix, appreciate neuro input    Review of Systems   Constitutional: Negative for activity change, diaphoresis, fatigue and fever.   Respiratory: Negative for shortness of breath and stridor.    Cardiovascular: Negative for leg swelling.   Gastrointestinal: Negative for diarrhea, nausea and vomiting.   Neurological: Negative for dizziness, tremors, seizures, syncope, facial asymmetry, speech difficulty, weakness, light-headedness, numbness and headaches.     Objective:     Vital Signs (Most Recent):  Temp: 98 °F (36.7 °C) (03/01/20 1610)  Pulse: 70 (03/01/20 1610)  Resp: 18 (03/01/20 1610)  BP: (!) 154/67 (03/01/20 1610)  SpO2: 96 % (03/01/20 1610) Vital Signs (24h Range):  Temp:  [96.7 °F (35.9 °C)-100.8 °F (38.2 °C)] 98 °F (36.7 °C)  Pulse:  [52-73] 70  Resp:  [12-20] 18  SpO2:  [93 %-98 %] 96 %  BP: (130-170)/(62-72) 154/67     Weight: 86.5 kg (190 lb 11.2 oz)  Body mass index is 28.57 kg/m².    Intake/Output Summary (Last 24 hours) at 3/1/2020 1632  Last data filed at 3/1/2020 0252  Gross per 24 hour   Intake 240 ml   Output 550 ml   Net -310 ml      Physical Exam   Constitutional: He is oriented to person, place, and time. He appears well-developed and well-nourished.   HENT:   Head: Normocephalic and atraumatic.   Eyes: EOM are normal.   Neck: Normal range of motion. Neck supple.   Cardiovascular: Normal rate.   Pulmonary/Chest: Effort normal.   Abdominal: Soft.   Musculoskeletal: Normal range of motion. He exhibits no edema.   Neurological: He is alert and oriented to person, place, and time.   Psychiatric: He has a normal mood and affect. His behavior is normal. Judgment and thought content normal.   Nursing note and vitals reviewed.      Significant Labs:   Recent Labs   Lab 02/29/20  1052 03/01/20  0501   WBC 4.76 4.51   HGB 14.9 13.6*   HCT 45.7 41.9    142*     Recent Labs   Lab 02/29/20  1048 02/29/20  1052 03/01/20  0501   NA  --  141  139   K  --  4.2 4.3   CL  --  103 103   CO2  --  30* 30*   BUN  --  17 16   CREATININE  --  0.9 0.9   GLU  --  114* 80   CALCIUM  --  9.3 8.7   MG 1.9  --  2.0   PHOS 2.5*  --  3.0     Recent Labs   Lab 02/29/20  1052 03/01/20  0501 03/01/20  0502   ALKPHOS 81 69  --    ALT 13 11  --    AST 19 20  --    ALBUMIN 3.6 3.1*  --    PROT 7.4 6.8  --    BILITOT 1.3* 0.9  --    INR 1.0  --  1.0      Recent Labs     02/29/20  1052 03/01/20  0501 03/01/20  0502   CPK 91  91 94  --    CPKMB 1.4 1.4  --    MB 1.5 1.5  --    TROPONINI 0.019  --  0.019     No results for input(s): POCTGLUCOSE in the last 168 hours.  Hemoglobin A1C   Date Value Ref Range Status   02/29/2020 5.8 (H) 4.0 - 5.6 % Final     Comment:     ADA Screening Guidelines:  5.7-6.4%  Consistent with prediabetes  >or=6.5%  Consistent with diabetes  High levels of fetal hemoglobin interfere with the HbA1C  assay. Heterozygous hemoglobin variants (HbS, HgC, etc)do  not significantly interfere with this assay.   However, presence of multiple variants may affect accuracy.       Scheduled Meds:   aspirin  81 mg Oral Daily    clopidogreL  75 mg Oral Daily    docusate sodium  100 mg Oral Daily    enoxaparin  40 mg Subcutaneous Daily    pantoprazole  40 mg Oral Daily    polyethylene glycol  17 g Oral Daily     Continuous Infusions:  As Needed: acetaminophen, labetalol, ondansetron, sodium chloride 0.9%    Significant Imaging:   Will f/u on MRI and CTA head/neck

## 2020-03-01 NOTE — PLAN OF CARE
VN cued into patients room for rounding. VN role explained and informed pt that VN will be working alongside the bedside care team throughout the day. Pt verbalized understanding that VN is available for any questions and education, and nurse and PCT will continue hourly rounding at bedside. Daughter at bedside. Informed that imaging may not be done until tomorrow. Dr. Hines informed that patient and family are available for DNR paperwork to be signed/filled out as copy is not currently in chart per bedside nurse. Fall education provided. Allotted time given for questions - all questions answered. Will continue to monitor and intervene PRN.

## 2020-03-01 NOTE — SUBJECTIVE & OBJECTIVE
Past Medical History:   Diagnosis Date    Hypertension        History reviewed. No pertinent surgical history.    Review of patient's allergies indicates:  No Known Allergies    Current Facility-Administered Medications on File Prior to Encounter   Medication    [COMPLETED] aspirin chewable tablet 81 mg     Current Outpatient Medications on File Prior to Encounter   Medication Sig    atenoloL (TENORMIN) 50 MG tablet     docusate sodium (STOOL SOFTENER) 100 MG capsule Take 100 mg by mouth once daily.    KRILL OIL ORAL Take 1 tablet by mouth once daily.    mv-min/FA/vit K/lycop/lut/zeax (OCUVITE EYE PLUS MULTI ORAL) Take 1 tablet by mouth once daily.     Family History     Problem Relation (Age of Onset)    No Known Problems Mother, Father        Tobacco Use    Smoking status: Never Smoker   Substance and Sexual Activity    Alcohol use: Not Currently    Drug use: Never    Sexual activity: Not Currently     Review of Systems   Constitutional: Negative for chills and fever.   HENT: Negative for congestion, postnasal drip and rhinorrhea.    Eyes: Negative for visual disturbance.   Respiratory: Negative for chest tightness and shortness of breath.    Cardiovascular: Negative for chest pain and palpitations.   Gastrointestinal: Negative for abdominal distention, abdominal pain, nausea and vomiting.   Genitourinary: Negative for difficulty urinating.   Musculoskeletal: Negative for arthralgias and myalgias.   Skin: Negative for color change and wound.   Neurological: Positive for speech difficulty (slurred speech) and weakness.   Psychiatric/Behavioral: Positive for confusion.     Objective:     Vital Signs (Most Recent):  Temp: (!) 100.8 °F (38.2 °C) (02/29/20 2056)  Pulse: 64 (02/29/20 2056)  Resp: 12 (02/29/20 2056)  BP: (!) 170/71 (02/29/20 2056)  SpO2: (!) 94 % (02/29/20 2321) Vital Signs (24h Range):  Temp:  [98 °F (36.7 °C)-100.8 °F (38.2 °C)] 100.8 °F (38.2 °C)  Pulse:  [62-68] 64  Resp:  [12-20] 12  SpO2:   [94 %-99 %] 94 %  BP: (134-170)/(64-72) 170/71     Weight: 86.5 kg (190 lb 11.2 oz)  Body mass index is 28.57 kg/m².    Physical Exam   Constitutional: He is oriented to person, place, and time. He appears well-developed and well-nourished.   HENT:   Head: Normocephalic and atraumatic.   Eyes: Pupils are equal, round, and reactive to light. EOM are normal.   Neck: Normal range of motion. Neck supple.   Cardiovascular: Normal rate.   Pulmonary/Chest: Effort normal and breath sounds normal.   Abdominal: Soft. Bowel sounds are normal.   Musculoskeletal: He exhibits no edema or deformity.   Neurological: He is alert and oriented to person, place, and time.   Skin: Skin is warm and dry.   Psychiatric: He has a normal mood and affect. His behavior is normal.         CRANIAL NERVES     CN III, IV, VI   Pupils are equal, round, and reactive to light.  Extraocular motions are normal.        Significant Labs:   A1C:   Recent Labs   Lab 02/29/20  2108   HGBA1C 5.8*     Bilirubin:   Recent Labs   Lab 02/29/20  1052   BILITOT 1.3*     BMP:   Recent Labs   Lab 02/29/20  1048 02/29/20  1052   GLU  --  114*   NA  --  141   K  --  4.2   CL  --  103   CO2  --  30*   BUN  --  17   CREATININE  --  0.9   CALCIUM  --  9.3   MG 1.9  --      CBC:   Recent Labs   Lab 02/29/20  1052   WBC 4.76   HGB 14.9   HCT 45.7        CMP:   Recent Labs   Lab 02/29/20  1052      K 4.2      CO2 30*   *   BUN 17   CREATININE 0.9   CALCIUM 9.3   PROT 7.4   ALBUMIN 3.6   BILITOT 1.3*   ALKPHOS 81   AST 19   ALT 13   ANIONGAP 8   EGFRNONAA >60     Cardiac Markers:   Recent Labs   Lab 02/29/20  1052   *     Coagulation:   Recent Labs   Lab 02/29/20  1052   INR 1.0   APTT 30.5     Lipid Panel:   Recent Labs   Lab 02/29/20  2108   CHOL 166   HDL 37*   LDLCALC 96.8   TRIG 161*   CHOLHDL 22.3     Magnesium:   Recent Labs   Lab 02/29/20  1048   MG 1.9     Troponin:   Recent Labs   Lab 02/29/20  1052   TROPONINI 0.019     TSH:    Recent Labs   Lab 02/29/20  1052   TSH 1.935     Urine Studies:   Recent Labs   Lab 02/29/20  1133   COLORU Yellow   APPEARANCEUA Clear   PHUR 6.0   SPECGRAV 1.025   PROTEINUA Negative   GLUCUA Negative   KETONESU Negative   BILIRUBINUA Negative   OCCULTUA Negative   NITRITE Negative   UROBILINOGEN Negative   LEUKOCYTESUR Negative       Significant Imaging: I have reviewed all pertinent imaging results/findings within the past 24 hours.

## 2020-03-01 NOTE — PLAN OF CARE
Plan of care reviewed with patient and family, understanding verbalized.  Pt remains SR on tele. Neuro checks q4.  Bed alarm on, call light in reach, fall precautions in place.

## 2020-03-01 NOTE — H&P
[facility] Hospital Medicine  History & Physical    Patient Name: Papo Chopra  MRN: [id number]  Admission Date: 2/29/2020  Attending Physician: Angel Hines DO   Primary Care Provider: Marvel Lemon MD         Patient information was obtained from patient, past medical records and ER records.     Subjective:     Principal Problem:Transient neurological symptoms    Chief Complaint: No chief complaint on file.       HPI: 84 y.o. male with past medical history of hypertension presented to [facility] ED with complaints of slurred speech.  He was awaken this morning by his daughter and was noted to have slurred speech and disorientation, went back to sleep, woke up 10 minutes later, then woke up again and was wobbly on his feet. He does not recall going to urgent care. He woke at 7:30am with these symptoms. Yesterday he was disoriented and not feeling well. Denies similar past episodes, aggravating or alleviating factors. Patient transferred to [facility] and admitted to [facility] medicine observation services, under my care, for further stroke workup and medical management.          Past Medical History:   Diagnosis Date    Hypertension        History reviewed. No pertinent surgical history.    Review of patient's allergies indicates:  No Known Allergies    Current Facility-Administered Medications on File Prior to Encounter   Medication    [COMPLETED] aspirin chewable tablet 81 mg     Current Outpatient Medications on File Prior to Encounter   Medication Sig    atenoloL (TENORMIN) 50 MG tablet     docusate sodium (STOOL SOFTENER) 100 MG capsule Take 100 mg by mouth once daily.    KRILL OIL ORAL Take 1 tablet by mouth once daily.    mv-min/FA/vit K/lycop/lut/zeax (OCUVITE EYE PLUS MULTI ORAL) Take 1 tablet by mouth once daily.     Family History     Problem Relation (Age of Onset)    No Known Problems Mother, Father        Tobacco Use    Smoking status: Never Smoker    Substance and Sexual Activity    Alcohol use: Not Currently    Drug use: Never    Sexual activity: Not Currently     Review of Systems   Constitutional: Negative for chills and fever.   HENT: Negative for congestion, postnasal drip and rhinorrhea.    Eyes: Negative for visual disturbance.   Respiratory: Negative for chest tightness and shortness of breath.    Cardiovascular: Negative for chest pain and palpitations.   Gastrointestinal: Negative for abdominal distention, abdominal pain, nausea and vomiting.   Genitourinary: Negative for difficulty urinating.   Musculoskeletal: Negative for arthralgias and myalgias.   Skin: Negative for color change and wound.   Neurological: Positive for speech difficulty (slurred speech) and weakness.   Psychiatric/Behavioral: Positive for confusion.     Objective:     Vital Signs (Most Recent):  Temp: (!) 100.8 °F (38.2 °C) (02/29/20 2056)  Pulse: 64 (02/29/20 2056)  Resp: 12 (02/29/20 2056)  BP: (!) 170/71 (02/29/20 2056)  SpO2: (!) 94 % (02/29/20 2321) Vital Signs (24h Range):  Temp:  [98 °F (36.7 °C)-100.8 °F (38.2 °C)] 100.8 °F (38.2 °C)  Pulse:  [62-68] 64  Resp:  [12-20] 12  SpO2:  [94 %-99 %] 94 %  BP: (134-170)/(64-72) 170/71     Weight: 86.5 kg (190 lb 11.2 oz)  Body mass index is 28.57 kg/m².    Physical Exam   Constitutional: He is oriented to person, place, and time. He appears well-developed and well-nourished.   HENT:   Head: Normocephalic and atraumatic.   Eyes: Pupils are equal, round, and reactive to light. EOM are normal.   Neck: Normal range of motion. Neck supple.   Cardiovascular: Normal rate.   Pulmonary/Chest: Effort normal and breath sounds normal.   Abdominal: Soft. Bowel sounds are normal.   Musculoskeletal: He exhibits no edema or deformity.   Neurological: He is alert and oriented to person, place, and time.   Skin: Skin is warm and dry.   Psychiatric: He has a normal mood and affect. His behavior is normal.         CRANIAL NERVES     CN III, IV, VI    Pupils are equal, round, and reactive to light.  Extraocular motions are normal.        Significant Labs:   A1C:   Recent Labs   Lab 02/29/20  2108   HGBA1C 5.8*     Bilirubin:   Recent Labs   Lab 02/29/20  1052   BILITOT 1.3*     BMP:   Recent Labs   Lab 02/29/20  1048 02/29/20  1052   GLU  --  114*   NA  --  141   K  --  4.2   CL  --  103   CO2  --  30*   BUN  --  17   CREATININE  --  0.9   CALCIUM  --  9.3   MG 1.9  --      CBC:   Recent Labs   Lab 02/29/20  1052   WBC 4.76   HGB 14.9   HCT 45.7        CMP:   Recent Labs   Lab 02/29/20  1052      K 4.2      CO2 30*   *   BUN 17   CREATININE 0.9   CALCIUM 9.3   PROT 7.4   ALBUMIN 3.6   BILITOT 1.3*   ALKPHOS 81   AST 19   ALT 13   ANIONGAP 8   EGFRNONAA >60     Cardiac Markers:   Recent Labs   Lab 02/29/20  1052   *     Coagulation:   Recent Labs   Lab 02/29/20  1052   INR 1.0   APTT 30.5     Lipid Panel:   Recent Labs   Lab 02/29/20  2108   CHOL 166   HDL 37*   LDLCALC 96.8   TRIG 161*   CHOLHDL 22.3     Magnesium:   Recent Labs   Lab 02/29/20  1048   MG 1.9     Troponin:   Recent Labs   Lab 02/29/20  1052   TROPONINI 0.019     TSH:   Recent Labs   Lab 02/29/20  1052   TSH 1.935     Urine Studies:   Recent Labs   Lab 02/29/20  1133   COLORU Yellow   APPEARANCEUA Clear   PHUR 6.0   SPECGRAV 1.025   PROTEINUA Negative   GLUCUA Negative   KETONESU Negative   BILIRUBINUA Negative   OCCULTUA Negative   NITRITE Negative   UROBILINOGEN Negative   LEUKOCYTESUR Negative       Significant Imaging: I have reviewed all pertinent imaging results/findings within the past 24 hours.    Assessment/Plan:     * Transient neurological symptoms  TIA (transient ischemic attack)  Slurred speech    -Continuous cardiac monitoring   -Troponin: WNL  -tele vascular consult complete and recommend workup for infection/metabolic disturbances. If no e/o of underlying condition, vessel imaging with CTA or MRA can be considered to r/o culprit of atheroembolism  or focal hypoperfusion.Recommend aspirin 81 mg until workup completed.  -CT of head: no acute intra cranial processes  -MRI of head: pending  -MRA of head and Neck: pending  -LDL: 96.8  -U/A: negative for UTI  -TSH: WNL  -B-12, Folate, RPR: pending  -Permissive HTN  -2D echo: pending  -Neuro checks Q4hrs  -PT/OT/SP evaluation        Hypertension, benign  Permissive HTN  Labetalol prn  Monitor        VTE Risk Mitigation (From admission, onward)         Ordered     enoxaparin injection 40 mg  Daily      02/29/20 2046     Place sequential compression device  Until discontinued      02/29/20 2049     IP VTE HIGH RISK PATIENT  Once      02/29/20 2046                   Hortencia Gonzalez NP  Department of Hospital Medicine   [facility]

## 2020-03-01 NOTE — ASSESSMENT & PLAN NOTE
TIA (transient ischemic attack)  Slurred speech    -Continuous cardiac monitoring   -Troponin: WNL  -tele vascular consult complete and recommend workup for infection/metabolic disturbances. If no e/o of underlying condition, vessel imaging with CTA or MRA can be considered to r/o culprit of atheroembolism or focal hypoperfusion.Recommend aspirin 81 mg until workup completed.  -CT of head: no acute intra cranial processes  -MRI of head: pending  -MRA of head and Neck: pending  -LDL: 96.8  -U/A: negative for UTI  -TSH: WNL  -B-12, Folate, RPR: pending  -Permissive HTN  -2D echo: pending  -Neuro checks Q4hrs  -PT/OT/SP evaluation  3/1 stable, no deficit  Await MRI and CTA results

## 2020-03-01 NOTE — PLAN OF CARE
VN completed admission questions with patient. Plan of care was discussed including VTE prophylaxis of lovenox.  All questions answered.   Education given on safety measures and using call light before getting out of bed by himself. Patient verbalized understanding. Bed locked and in lowest position. Alarm on.

## 2020-03-01 NOTE — PROGRESS NOTES
[facility] Hospital Medicine  Progress Note    Patient Name: Papo Chopra  MRN: [id number]  Patient Class: OP- Observation   Admission Date: 2/29/2020  Length of Stay: 1 days  Attending Physician: Angel Hines DO  Primary Care Provider: Marvel Lemon MD        Subjective:     Principal Problem:Transient neurological symptoms        HPI:  84 y.o. male with past medical history of hypertension presented to [facility] ED with complaints of slurred speech.  He was awaken this morning by his daughter and was noted to have slurred speech and disorientation, went back to sleep, woke up 10 minutes later, then woke up again and was wobbly on his feet. He does not recall going to urgent care. He woke at 7:30am with these symptoms. Yesterday he was disoriented and not feeling well. Denies similar past episodes, aggravating or alleviating factors. Patient transferred to [facility] and admitted [facility] observation services, under my care, for further stroke workup and medical management.          Overview/Hospital Course:  3/1 pt seen, feeling ok, no slurred speech appreciated and no weaknesses. Feels back to vaseline, neurology evaluated pt, wants stat MRI and CTA. Spoke with house supervisor and on call radiologist, all is set for the imaging today    Interval History: CTA neck/head, and MRI stat today,. Added plavix, appreciate neuro input    Review of Systems   Constitutional: Negative for activity change, diaphoresis, fatigue and fever.   Respiratory: Negative for shortness of breath and stridor.    Cardiovascular: Negative for leg swelling.   Gastrointestinal: Negative for diarrhea, nausea and vomiting.   Neurological: Negative for dizziness, tremors, seizures, syncope, facial asymmetry, speech difficulty, weakness, light-headedness, numbness and headaches.     Objective:     Vital Signs (Most Recent):  Temp: 98 °F (36.7 °C) (03/01/20 1610)  Pulse: 70 (03/01/20  1610)  Resp: 18 (03/01/20 1610)  BP: (!) 154/67 (03/01/20 1610)  SpO2: 96 % (03/01/20 1610) Vital Signs (24h Range):  Temp:  [96.7 °F (35.9 °C)-100.8 °F (38.2 °C)] 98 °F (36.7 °C)  Pulse:  [52-73] 70  Resp:  [12-20] 18  SpO2:  [93 %-98 %] 96 %  BP: (130-170)/(62-72) 154/67     Weight: 86.5 kg (190 lb 11.2 oz)  Body mass index is 28.57 kg/m².    Intake/Output Summary (Last 24 hours) at 3/1/2020 1632  Last data filed at 3/1/2020 0252  Gross per 24 hour   Intake 240 ml   Output 550 ml   Net -310 ml      Physical Exam   Constitutional: He is oriented to person, place, and time. He appears well-developed and well-nourished.   HENT:   Head: Normocephalic and atraumatic.   Eyes: EOM are normal.   Neck: Normal range of motion. Neck supple.   Cardiovascular: Normal rate.   Pulmonary/Chest: Effort normal.   Abdominal: Soft.   Musculoskeletal: Normal range of motion. He exhibits no edema.   Neurological: He is alert and oriented to person, place, and time.   Psychiatric: He has a normal mood and affect. His behavior is normal. Judgment and thought content normal.   Nursing note and vitals reviewed.      Significant Labs:   Recent Labs   Lab 02/29/20  1052 03/01/20  0501   WBC 4.76 4.51   HGB 14.9 13.6*   HCT 45.7 41.9    142*     Recent Labs   Lab 02/29/20  1048 02/29/20  1052 03/01/20  0501   NA  --  141 139   K  --  4.2 4.3   CL  --  103 103   CO2  --  30* 30*   BUN  --  17 16   CREATININE  --  0.9 0.9   GLU  --  114* 80   CALCIUM  --  9.3 8.7   MG 1.9  --  2.0   PHOS 2.5*  --  3.0     Recent Labs   Lab 02/29/20  1052 03/01/20  0501 03/01/20  0502   ALKPHOS 81 69  --    ALT 13 11  --    AST 19 20  --    ALBUMIN 3.6 3.1*  --    PROT 7.4 6.8  --    BILITOT 1.3* 0.9  --    INR 1.0  --  1.0      Recent Labs     02/29/20  1052 03/01/20  0501 03/01/20  0502   CPK 91  91 94  --    CPKMB 1.4 1.4  --    MB 1.5 1.5  --    TROPONINI 0.019  --  0.019     No results for input(s): POCTGLUCOSE in the last 168 hours.  Hemoglobin A1C    Date Value Ref Range Status   02/29/2020 5.8 (H) 4.0 - 5.6 % Final     Comment:     ADA Screening Guidelines:  5.7-6.4%  Consistent with prediabetes  >or=6.5%  Consistent with diabetes  High levels of fetal hemoglobin interfere with the HbA1C  assay. Heterozygous hemoglobin variants (HbS, HgC, etc)do  not significantly interfere with this assay.   However, presence of multiple variants may affect accuracy.       Scheduled Meds:   aspirin  81 mg Oral Daily    clopidogreL  75 mg Oral Daily    docusate sodium  100 mg Oral Daily    enoxaparin  40 mg Subcutaneous Daily    pantoprazole  40 mg Oral Daily    polyethylene glycol  17 g Oral Daily     Continuous Infusions:  As Needed: acetaminophen, labetalol, ondansetron, sodium chloride 0.9%    Significant Imaging:   Will f/u on MRI and CTA head/neck      Assessment/Plan:      * Transient neurological symptoms  TIA (transient ischemic attack)  Slurred speech    -Continuous cardiac monitoring   -Troponin: WNL  -tele vascular consult complete and recommend workup for infection/metabolic disturbances. If no e/o of underlying condition, vessel imaging with CTA or MRA can be considered to r/o culprit of atheroembolism or focal hypoperfusion.Recommend aspirin 81 mg until workup completed.  -CT of head: no acute intra cranial processes  -MRI of head: pending  -MRA of head and Neck: pending  -LDL: 96.8  -U/A: negative for UTI  -TSH: WNL  -B-12, Folate, RPR: pending  -Permissive HTN  -2D echo: pending  -Neuro checks Q4hrs  -PT/OT/SP evaluation  3/1 stable, no deficit  Await MRI and CTA results        Slurred speech  3/1 not evident on my examiniation.  F/u neuro input  F/u imaging      Hypertension, benign  Permissive HTN  Labetalol prn  Monitor  3/1 permissive htn  monitor      VTE Risk Mitigation (From admission, onward)         Ordered     enoxaparin injection 40 mg  Daily      02/29/20 2046     Place sequential compression device  Until discontinued      02/29/20 2049      IP VTE HIGH RISK PATIENT  Once      02/29/20 2046                      Angel Hines DO  Department of Hospital Medicine   [facility]

## 2020-03-01 NOTE — PLAN OF CARE
Problem: Occupational Therapy Goal  Goal: Occupational Therapy Goal  Outcome: Met   OT initial eval completed.  Pt at baseline with ADLS and fx ambulation.  No residual effects of TIA noted.  Will discontinue OT orders.

## 2020-03-01 NOTE — HOSPITAL COURSE
3/1 pt seen, feeling ok, no slurred speech appreciated and no weaknesses. Feels back to vaseline, neurology evaluated pt, wants stat MRI and CTA. Spoke with house supervisor and on call radiologist, all is set for the imaging today  3/2 pt seen, at his baseline, neuro still wanting to evaluate the cta neck/brain, resultas pending, likely dc home tomorrow with outpatient eeg  3/3 cta is negative MRI is negative, pt is symptoms free, neurology recommended asa and lipitor ok to dc home with outpatient f/u with PCP and neurology for outpatient EEG

## 2020-03-01 NOTE — PT/OT/SLP EVAL
"Speech Language Pathology Evaluation  Bedside Swallow    Patient Name:  Papo Chopra   MRN:  [id number]  Admitting Diagnosis: Transient neurological symptoms    Recommendations:                 General Recommendations:  Follow-up not indicated  Diet recommendations:  Regular, Thin   Aspiration Precautions: Frequent oral care and Standard aspiration precautions   General Precautions: Standard,    Communication strategies:  none    History:     HPI: 84 y.o. male with past medical history of hypertension presented to [facility] ED with complaints of slurred speech.  He was awaken this morning by his daughter and was noted to have slurred speech and disorientation, went back to sleep, woke up 10 minutes later, then woke up again and was wobbly on his feet. He does not recall going to urgent care. He woke at 7:30am with these symptoms. Yesterday he was disoriented and not feeling well. Denies similar past episodes, aggravating or alleviating factors. Patient transferred to [facility] and admitted to [facility] observation services for further stroke workup and medical management.    Past Medical History:   Diagnosis Date    Hypertension      History reviewed. No pertinent surgical history.    Modified Barium Swallow: None on file    Chest X-Rays: Completed 2/29/2020: "The cardiomediastinal silhouette is within normal limits.  The lungs are well expanded without consolidation or pleural effusion."    Prior diet: regular solids/thin liquids    Subjective     Pt awake, alert, oriented x4--agreeable to Bedside Swallow Study. Pt and pt's family deny speech, language, cognitive, and swallowing deficits. No speech, language, or cognitive deficits noted during assessment or informal observation. Pt and pt's family confirm pt is back to baseline for speech, language, cognitive, and swallowing.    Pain/Comfort:  · Pain Rating 1: 0/10    Objective:     Oral Musculature Evaluation  · Oral Musculature: " WNL  · Dentition: present and adequate  · Secretion Management: adequate  · Mucosal Quality: good  · Mandibular Strength and Mobility: WNL  · Oral Labial Strength and Mobility: WNL  · Lingual Strength and Mobility: WNL  · Buccal Strength and Mobility: WNL  · Volitional Cough: WNL  · Volitional Swallow: WNL  · Voice Prior to PO Intake: Strong, clear, dry    Bedside Swallow Eval:   Consistencies Assessed:  · Thin liquids, Puree consistencies, Regular solids    Oral Phase:   · WNL    Pharyngeal Phase:   · no overt clinical signs/symptoms of aspiration  · no overt clinical signs/symptoms of pharyngeal dysphagia    Treatment: SLP provided skilled education to pt re: rationale for BSE, role of SLP in POC, swallow study results, diet recs, and swallow precautions. Pt verbalized understanding and agreement of all information provided.     Assessment:     Papo Chopra is a 84 y.o. male is at baseline for speech, language, cognition, and swallowing. RECS: regular solids/thin liquids. No additional ST services warranted at this time.    Goals:   Multidisciplinary Problems     SLP Goals     Not on file          Multidisciplinary Problems (Resolved)        Problem: SLP Goal    Goal Priority Disciplines Outcome   SLP Goal   (Resolved)     SLP Met   Description:  Goals:  1. Patient will successfully participate in clinical swallow evaluation and tolerate po trials with no overt s/s of aspiration. --GOAL MET 3/1                  Plan:     · Plan of Care reviewed with:  patient, family   · SLP Follow-Up:  No       Discharge recommendations:  other (see comments)(No ST services warranted)     Time Tracking:     SLP Treatment Date:   03/01/20  Speech Start Time:  1307  Speech Stop Time:  1319     Speech Total Time (min):  12 min    Billable Minutes: Eval Swallow and Oral Function 12 minutes    Chelsey Ravi CCC-SLP  03/01/2020

## 2020-03-01 NOTE — HPI
84 y.o. male with past medical history of hypertension presented to [facility] ED with complaints of slurred speech.  He was awaken this morning by his daughter and was noted to have slurred speech and disorientation, went back to sleep, woke up 10 minutes later, then woke up again and was wobbly on his feet. He does not recall going to urgent care. He woke at 7:30am with these symptoms. Yesterday he was disoriented and not feeling well. Denies similar past episodes, aggravating or alleviating factors. Patient transferred to [facility] and admitted to [facility] observation services, under my care, for further stroke workup and medical management.

## 2020-03-01 NOTE — PLAN OF CARE
Problem: SLP Goal  Goal: SLP Goal  Description  Goals:  1. Patient will successfully participate in clinical swallow evaluation and tolerate po trials with no overt s/s of aspiration. --GOAL MET 3/1   Outcome: Met     3/1/2020: Bedside Swallow Study completed this PM. Pt is at baseline for speech, language, cognition, and swallowing. RECS: regular solids/thin liquids. No additional ST services warranted at this time.  JYOTHI Bray. CCC-SLP  Speech-Language Pathologist

## 2020-03-01 NOTE — PLAN OF CARE
POC reviewed with patient. Verbal reported of understanding. AAOX4. Family at bedside. NSR on tele with no red alarm noted. Free of falls. Due meds given per ordered. No complaint of pian, SOB, or other discomfort throughout the shift. Safety maintained. Neuro check every four hours performed per ordered. Fall and aspiration precautions maintained. No acute distress noted. Bed in lowest position. Bed alarm on. Wheels locked. Call bell within reach and educated to call for assistance. Will continue to monitor.

## 2020-03-01 NOTE — PLAN OF CARE
84 M with history of HTN woke up this morning with slurred speech and disorientation, and wobbly in feet. Patient was disoriented and not feeling well yesterday. Patient evaluated by telestroke, with NIHSS=0, return back to baseline, no tpa given, CTH negative for bleed but show some intracranial ICA stenosis. Neurology consulted for TIA work up.    Plan to obtain:  - CTA head & neck  - MRI brain with out contrast  - TTE   - Continue ASA 81 till work up  - Goal /100 till MRI brain negative for stroke/first 24 hours of symptom onset, goal <140/90 after 24 hours   - ABCD2- 5, moderate risk for stroke  - Bqc2e-6.8, TSH, ESR, Folate & B12 normal, RPR negative  - LDL- 96.8, HDL- 37, Plan to start on Lipitor 20 daily    Travis Tello MD  [employer] Neurology PGY-2

## 2020-03-01 NOTE — CONSULTS
"NEUROLOGY FLOOR CONSULT    Reason for consult:  Transient neurological symptoms    Informant:  Patient and his daughter       Other sources of information : past medical records    CC:  "slurred speech, wobbly gait and disorientation"    HPI:   Papo Chopra is a 84 y.o. year old with PMHx of hypertension who was admitted for complaints of slurred speech and disorientation and wobbly gait. Patient started to feel cold on Thursday night, went for fishing on Friday morning and was not active and did not eat his meals. His daughter noticed his speech to be slurred like spitting out, wobbly gait and slowed, able to understand what they speak but was taking more time to process and reply the question but was appropriate. He was taken to [facility], BP per family at that time was normal, Patient was tele-stroke activate and no TPA was given because his symptoms resolved. Patient mentions he takes only Atenolol 50 mg once daily for his BP, check BP daily his SBP usually runs in a range of 120-150's. He is on [employer] diet program "Take off pounds sensitively" for about 2 years and lost 55 pounds. He lost about 7-8 pounds in last 3 months. As per patients daughter patient is back to his baseline today. Denies any vision loss, weakness or abnormal sensation. Denies any urinary incontinence or orthostatic symptoms.    Patient admits to compliant. He admits taking MVT and OTC stool softener every day    ROS: As per HPI    Histories:     Allergies:  Patient has no known allergies.    Current Medications:    Current Facility-Administered Medications   Medication Dose Route Frequency Provider Last Rate Last Dose    acetaminophen tablet 650 mg  650 mg Oral Q6H PRN Hortencia Gonzalez, DANIEL   650 mg at 02/29/20 2121    aspirin EC tablet 81 mg  81 mg Oral Daily Angel Hines, DO   81 mg at 03/01/20 0814    clopidogreL tablet 75 mg  75 mg Oral Daily Angel Hines, DO   75 mg at 03/01/20 1003    docusate sodium capsule 100 mg  100 mg " Oral Daily Hortencia Gonzalez NP   100 mg at 03/01/20 0814    enoxaparin injection 40 mg  40 mg Subcutaneous Daily Angel Hines, DO   40 mg at 02/29/20 2121    labetalol injection 10 mg  10 mg Intravenous Q6H PRN Hortencia Gonzalez NP        ondansetron injection 4 mg  4 mg Intravenous Q8H PRN Angel Hines, DO        pantoprazole EC tablet 40 mg  40 mg Oral Daily Angel Flora, DO   40 mg at 03/01/20 0814    polyethylene glycol packet 17 g  17 g Oral Daily Angel Lucindani, DO   17 g at 03/01/20 0814    sodium chloride 0.9% flush 10 mL  10 mL Intravenous PRN Angel Hines, DO           Past Medical/Surgical/Family History:  Medical:   Past Medical History:   Diagnosis Date    Hypertension       Surgeries: History reviewed. No pertinent surgical history.   Family:   Family History   Problem Relation Age of Onset    No Known Problems Mother     No Known Problems Father      Family history - Significant for Dementia in his father at age of 80 years    Social History:    Substance Abuse/Dependence History:  Tobacco: 25 year smoking history, quit in 1980's  EtOH: none  Ilicits: Denies    Occupational/Employment History:  Occupation: None      Current Evaluation:     Vital Signs:   Vitals:    03/01/20 1152   BP: (!) 156/71   Pulse: 70   Resp: 18   Temp: 98.5 °F (36.9 °C)          ORIENTATION: AOX3, awake and alert    MEMORY: recent and remote memory intact    LANGUAGE: 0=No aphasia, normal    CRANIAL NERVES:  II: visual acuity normal bilaterally, II: pupils equal, round, reactive to light and accommodation, III,IV,VI: extraocular muscles extra-ocular motions intact, V: mastication normal, V: facial light touch sensation normal bilaterally, VII: upper facial muscle function normal bilaterally, VII: lower facial muscle function normal bilaterally, VIII: hearing normal, IX: soft palate elevation normal in midline, XI: sternocleidomastoid strength normal bilaterally    MOTOR:  Pronator drift: absent    Right UE   5/5  Left UE   5/5  Right deltoid  5/5  Left deltoid  5/5  Right biceps  5/5  Left biceps  5/5  Right hip flexor  5/5  Left hip flexor  5/5  Wrist - flexion and extension 5/5 bilaterally  Ankle - plantar and dorsal flexion 5/5 bilaterally    no evidence of contractility    Tone: normal    DTR'S:  2+ bilaterally    SENSORY:  normal to light touch and pressure    CEREBELLAR/GAIT:  Finger to nose:normal  Heel to shin:not examined  Gait: Not examined    NIHSS= 0 on my exam (3/1/2020)    LABORATORY STUDIES:  Recent Results (from the past 24 hour(s))   Lipid panel    Collection Time: 02/29/20  9:08 PM   Result Value Ref Range    Cholesterol 166 120 - 199 mg/dL    Triglycerides 161 (H) 30 - 150 mg/dL    HDL 37 (L) 40 - 75 mg/dL    LDL Cholesterol 96.8 63.0 - 159.0 mg/dL    Hdl/Cholesterol Ratio 22.3 20.0 - 50.0 %    Total Cholesterol/HDL Ratio 4.5 2.0 - 5.0    Non-HDL Cholesterol 129 mg/dL   Hemoglobin A1c    Collection Time: 02/29/20  9:08 PM   Result Value Ref Range    Hemoglobin A1C 5.8 (H) 4.0 - 5.6 %    Estimated Avg Glucose 120 68 - 131 mg/dL   RPR    Collection Time: 02/29/20  9:08 PM   Result Value Ref Range    RPR Non-reactive Non-reactive   Folate    Collection Time: 02/29/20  9:08 PM   Result Value Ref Range    Folate 9.8 4.0 - 24.0 ng/mL   Vitamin B12    Collection Time: 02/29/20  9:08 PM   Result Value Ref Range    Vitamin B-12 384 210 - 950 pg/mL   CK-MB    Collection Time: 03/01/20  5:01 AM   Result Value Ref Range    CPK 94 20 - 200 U/L    CPK MB 1.4 0.1 - 6.5 ng/mL    MB% 1.5 0.0 - 5.0 %   CBC auto differential    Collection Time: 03/01/20  5:01 AM   Result Value Ref Range    WBC 4.51 3.90 - 12.70 K/uL    RBC 4.73 4.60 - 6.20 M/uL    Hemoglobin 13.6 (L) 14.0 - 18.0 g/dL    Hematocrit 41.9 40.0 - 54.0 %    Mean Corpuscular Volume 89 82 - 98 fL    Mean Corpuscular Hemoglobin 28.8 27.0 - 31.0 pg    Mean Corpuscular Hemoglobin Conc 32.5 32.0 - 36.0 g/dL    RDW 12.7 11.5 - 14.5 %    Platelets 142 (L) 150 -  350 K/uL    MPV 11.1 9.2 - 12.9 fL    Immature Granulocytes 0.2 0.0 - 0.5 %    Gran # (ANC) 1.9 1.8 - 7.7 K/uL    Immature Grans (Abs) 0.01 0.00 - 0.04 K/uL    Lymph # 1.6 1.0 - 4.8 K/uL    Mono # 1.0 0.3 - 1.0 K/uL    Eos # 0.1 0.0 - 0.5 K/uL    Baso # 0.02 0.00 - 0.20 K/uL    nRBC 0 0 /100 WBC    Gran% 41.5 38.0 - 73.0 %    Lymph% 34.4 18.0 - 48.0 %    Mono% 21.1 (H) 4.0 - 15.0 %    Eosinophil% 2.4 0.0 - 8.0 %    Basophil% 0.4 0.0 - 1.9 %    Differential Method Automated    Comprehensive metabolic panel    Collection Time: 03/01/20  5:01 AM   Result Value Ref Range    Sodium 139 136 - 145 mmol/L    Potassium 4.3 3.5 - 5.1 mmol/L    Chloride 103 95 - 110 mmol/L    CO2 30 (H) 23 - 29 mmol/L    Glucose 80 70 - 110 mg/dL    BUN, Bld 16 8 - 23 mg/dL    Creatinine 0.9 0.5 - 1.4 mg/dL    Calcium 8.7 8.7 - 10.5 mg/dL    Total Protein 6.8 6.0 - 8.4 g/dL    Albumin 3.1 (L) 3.5 - 5.2 g/dL    Total Bilirubin 0.9 0.1 - 1.0 mg/dL    Alkaline Phosphatase 69 55 - 135 U/L    AST 20 10 - 40 U/L    ALT 11 10 - 44 U/L    Anion Gap 6 (L) 8 - 16 mmol/L    eGFR if African American >60 >60 mL/min/1.73 m^2    eGFR if non African American >60 >60 mL/min/1.73 m^2   Magnesium    Collection Time: 03/01/20  5:01 AM   Result Value Ref Range    Magnesium 2.0 1.6 - 2.6 mg/dL   Phosphorus    Collection Time: 03/01/20  5:01 AM   Result Value Ref Range    Phosphorus 3.0 2.7 - 4.5 mg/dL   Troponin I    Collection Time: 03/01/20  5:02 AM   Result Value Ref Range    Troponin I 0.019 0.000 - 0.026 ng/mL   APTT    Collection Time: 03/01/20  5:02 AM   Result Value Ref Range    aPTT 32.7 (H) 21.0 - 32.0 sec   Protime-INR    Collection Time: 03/01/20  5:02 AM   Result Value Ref Range    Prothrombin Time 10.9 9.0 - 12.5 sec    INR 1.0 0.8 - 1.2       Thyroid normal  HgA1C%:  5.8  Vit B12: normal  Folate:  Normal    RADIOLOGY STUDIES:  I have personally reviewed the images performed.     HEAD CT: No bleeding, ICA intracranial stenosis, mild diffuse cerebral  atrophy    BRAIN MRI: In process      Assessment:  P     85 Y/O with history of HTN, admitted for slurred speech, wobbly gait and difficulty recalling recent event, with NIHSS-0, no TPA given due to complete resolution of symptoms was consulted for TIA/Stroke work up. Patient signs and symptoms concerning for TIA or global hypoperfusion. CTH head shows moderate stenosis of intracranial ICA's.     Treatment Plan:    - Obtain CTA head & neck  - MRI brain with out contrast  - TTE   - on ASA 81 daily  - Goal BP <140/90   - ABCD2- 5, moderate risk for stroke, continue ASA 81 mg daily, watch for any falls or signs of bleeding.   - Vay8d-3.8, F/U with PCP, goal BG<140  - TSH, ESR, Folate & B12 normal, RPR negative  - LDL- 96.8, HDL- 37, Plan to start on Lipitor 20 mg daily  - PT/OT/SLP  - Please call us for any questions with abnormal MRI or CTA head and neck    Travis Tello MD  [employer] Neurology PGY-2    Differential diagnosis was explained to the patient. All questions were answered. Patient understood and agreed to adhere to plan.       Case discussed with Dr. Duong who agree with the assessment and plan.       Appreciate the consult.

## 2020-03-02 PROBLEM — Z91.041 ALLERGIC TO IV CONTRAST: Status: ACTIVE | Noted: 2020-03-02

## 2020-03-02 LAB
AORTIC ROOT ANNULUS: 4.09 CM
ASCENDING AORTA: 3.03 CM
AV INDEX (PROSTH): 0.81
AV MEAN GRADIENT: 5 MMHG
AV PEAK GRADIENT: 8 MMHG
AV VALVE AREA: 3.93 CM2
AV VELOCITY RATIO: 0.79
BASOPHILS # BLD AUTO: 0.03 K/UL (ref 0–0.2)
BASOPHILS NFR BLD: 0.7 % (ref 0–1.9)
BSA FOR ECHO PROCEDURE: 2.04 M2
CV ECHO LV RWT: 0.54 CM
DIFFERENTIAL METHOD: ABNORMAL
DOP CALC AO PEAK VEL: 1.45 M/S
DOP CALC AO VTI: 34.63 CM
DOP CALC LVOT AREA: 4.8 CM2
DOP CALC LVOT DIAMETER: 2.48 CM
DOP CALC LVOT PEAK VEL: 1.14 M/S
DOP CALC LVOT STROKE VOLUME: 136.01 CM3
DOP CALCLVOT PEAK VEL VTI: 28.17 CM
E WAVE DECELERATION TIME: 310.69 MSEC
E/A RATIO: 0.61
E/E' RATIO: 12 M/S
ECHO LV POSTERIOR WALL: 1.13 CM (ref 0.6–1.1)
EOSINOPHIL # BLD AUTO: 0.2 K/UL (ref 0–0.5)
EOSINOPHIL NFR BLD: 3.5 % (ref 0–8)
ERYTHROCYTE [DISTWIDTH] IN BLOOD BY AUTOMATED COUNT: 12.5 % (ref 11.5–14.5)
FRACTIONAL SHORTENING: 42 % (ref 28–44)
HCT VFR BLD AUTO: 41.8 % (ref 40–54)
HGB BLD-MCNC: 13.7 G/DL (ref 14–18)
IMM GRANULOCYTES # BLD AUTO: 0.01 K/UL (ref 0–0.04)
IMM GRANULOCYTES NFR BLD AUTO: 0.2 % (ref 0–0.5)
INTERVENTRICULAR SEPTUM: 1.25 CM (ref 0.6–1.1)
LA MAJOR: 5.27 CM
LA MINOR: 3.61 CM
LA WIDTH: 3.92 CM
LEFT ATRIUM SIZE: 3.54 CM
LEFT ATRIUM VOLUME INDEX: 24.8 ML/M2
LEFT ATRIUM VOLUME: 50.54 CM3
LEFT INTERNAL DIMENSION IN SYSTOLE: 2.43 CM (ref 2.1–4)
LEFT VENTRICLE DIASTOLIC VOLUME INDEX: 38.04 ML/M2
LEFT VENTRICLE DIASTOLIC VOLUME: 77.64 ML
LEFT VENTRICLE MASS INDEX: 86 G/M2
LEFT VENTRICLE SYSTOLIC VOLUME INDEX: 10.2 ML/M2
LEFT VENTRICLE SYSTOLIC VOLUME: 20.86 ML
LEFT VENTRICULAR INTERNAL DIMENSION IN DIASTOLE: 4.18 CM (ref 3.5–6)
LEFT VENTRICULAR MASS: 174.71 G
LV LATERAL E/E' RATIO: 13 M/S
LV SEPTAL E/E' RATIO: 11.14 M/S
LYMPHOCYTES # BLD AUTO: 1.8 K/UL (ref 1–4.8)
LYMPHOCYTES NFR BLD: 38.1 % (ref 18–48)
MAGNESIUM SERPL-MCNC: 1.9 MG/DL (ref 1.6–2.6)
MCH RBC QN AUTO: 28.5 PG (ref 27–31)
MCHC RBC AUTO-ENTMCNC: 32.8 G/DL (ref 32–36)
MCV RBC AUTO: 87 FL (ref 82–98)
MONOCYTES # BLD AUTO: 1 K/UL (ref 0.3–1)
MONOCYTES NFR BLD: 21.6 % (ref 4–15)
MV PEAK A VEL: 1.27 M/S
MV PEAK E VEL: 0.78 M/S
NEUTROPHILS # BLD AUTO: 1.7 K/UL (ref 1.8–7.7)
NEUTROPHILS NFR BLD: 35.9 % (ref 38–73)
NRBC BLD-RTO: 0 /100 WBC
PHOSPHATE SERPL-MCNC: 2.8 MG/DL (ref 2.7–4.5)
PLATELET # BLD AUTO: 152 K/UL (ref 150–350)
PLATELET BLD QL SMEAR: ABNORMAL
PMV BLD AUTO: 11.6 FL (ref 9.2–12.9)
RA MAJOR: 4.36 CM
RA PRESSURE: 3 MMHG
RA WIDTH: 3.48 CM
RBC # BLD AUTO: 4.81 M/UL (ref 4.6–6.2)
STJ: 3.11 CM
TDI LATERAL: 0.06 M/S
TDI SEPTAL: 0.07 M/S
TDI: 0.07 M/S
TRICUSPID ANNULAR PLANE SYSTOLIC EXCURSION: 2.88 CM
WBC # BLD AUTO: 4.59 K/UL (ref 3.9–12.7)

## 2020-03-02 PROCEDURE — 84100 ASSAY OF PHOSPHORUS: CPT

## 2020-03-02 PROCEDURE — 96361 HYDRATE IV INFUSION ADD-ON: CPT | Performed by: HOSPITALIST

## 2020-03-02 PROCEDURE — 63600175 PHARM REV CODE 636 W HCPCS: Performed by: HOSPITALIST

## 2020-03-02 PROCEDURE — 36415 COLL VENOUS BLD VENIPUNCTURE: CPT

## 2020-03-02 PROCEDURE — 94761 N-INVAS EAR/PLS OXIMETRY MLT: CPT

## 2020-03-02 PROCEDURE — G0378 HOSPITAL OBSERVATION PER HR: HCPCS

## 2020-03-02 PROCEDURE — 97802 MEDICAL NUTRITION INDIV IN: CPT

## 2020-03-02 PROCEDURE — S0028 INJECTION, FAMOTIDINE, 20 MG: HCPCS | Performed by: NURSE PRACTITIONER

## 2020-03-02 PROCEDURE — 85025 COMPLETE CBC W/AUTO DIFF WBC: CPT

## 2020-03-02 PROCEDURE — 96375 TX/PRO/DX INJ NEW DRUG ADDON: CPT | Performed by: HOSPITALIST

## 2020-03-02 PROCEDURE — 83735 ASSAY OF MAGNESIUM: CPT

## 2020-03-02 PROCEDURE — 25000003 PHARM REV CODE 250: Performed by: NURSE PRACTITIONER

## 2020-03-02 PROCEDURE — 25000003 PHARM REV CODE 250: Performed by: HOSPITALIST

## 2020-03-02 PROCEDURE — 63600175 PHARM REV CODE 636 W HCPCS: Performed by: NURSE PRACTITIONER

## 2020-03-02 PROCEDURE — 96372 THER/PROPH/DIAG INJ SC/IM: CPT | Mod: 59 | Performed by: HOSPITALIST

## 2020-03-02 PROCEDURE — 96374 THER/PROPH/DIAG INJ IV PUSH: CPT | Performed by: HOSPITALIST

## 2020-03-02 RX ORDER — DIPHENHYDRAMINE HYDROCHLORIDE 50 MG/ML
25 INJECTION INTRAMUSCULAR; INTRAVENOUS EVERY 6 HOURS
Status: DISCONTINUED | OUTPATIENT
Start: 2020-03-02 | End: 2020-03-03 | Stop reason: HOSPADM

## 2020-03-02 RX ORDER — SODIUM CHLORIDE 9 MG/ML
INJECTION, SOLUTION INTRAVENOUS CONTINUOUS
Status: DISCONTINUED | OUTPATIENT
Start: 2020-03-02 | End: 2020-03-03 | Stop reason: HOSPADM

## 2020-03-02 RX ORDER — DIPHENHYDRAMINE HYDROCHLORIDE 50 MG/ML
25 INJECTION INTRAMUSCULAR; INTRAVENOUS EVERY 6 HOURS PRN
Status: DISCONTINUED | OUTPATIENT
Start: 2020-03-02 | End: 2020-03-02

## 2020-03-02 RX ORDER — FAMOTIDINE 10 MG/ML
20 INJECTION INTRAVENOUS 2 TIMES DAILY
Status: DISCONTINUED | OUTPATIENT
Start: 2020-03-02 | End: 2020-03-03 | Stop reason: HOSPADM

## 2020-03-02 RX ORDER — DIPHENHYDRAMINE HYDROCHLORIDE 50 MG/ML
25 INJECTION INTRAMUSCULAR; INTRAVENOUS EVERY 6 HOURS
Status: DISCONTINUED | OUTPATIENT
Start: 2020-03-03 | End: 2020-03-02

## 2020-03-02 RX ORDER — METHYLPREDNISOLONE SOD SUCC 125 MG
125 VIAL (EA) INJECTION EVERY 6 HOURS
Status: DISCONTINUED | OUTPATIENT
Start: 2020-03-03 | End: 2020-03-03 | Stop reason: HOSPADM

## 2020-03-02 RX ADMIN — ENOXAPARIN SODIUM 40 MG: 100 INJECTION SUBCUTANEOUS at 04:03

## 2020-03-02 RX ADMIN — GUAIFENESIN 600 MG: 600 TABLET, EXTENDED RELEASE ORAL at 08:03

## 2020-03-02 RX ADMIN — CLOPIDOGREL BISULFATE 75 MG: 75 TABLET ORAL at 08:03

## 2020-03-02 RX ADMIN — FAMOTIDINE 20 MG: 10 INJECTION, SOLUTION INTRAVENOUS at 10:03

## 2020-03-02 RX ADMIN — PANTOPRAZOLE SODIUM 40 MG: 40 TABLET, DELAYED RELEASE ORAL at 08:03

## 2020-03-02 RX ADMIN — SODIUM CHLORIDE: 0.9 INJECTION, SOLUTION INTRAVENOUS at 09:03

## 2020-03-02 RX ADMIN — DIPHENHYDRAMINE HYDROCHLORIDE 25 MG: 50 INJECTION, SOLUTION INTRAMUSCULAR; INTRAVENOUS at 10:03

## 2020-03-02 RX ADMIN — ASPIRIN 81 MG: 81 TABLET, COATED ORAL at 08:03

## 2020-03-02 NOTE — PLAN OF CARE
Visit with pt, daughter at bedside.  Pt independent with ADL's, uses no DME, lives at home with his spouse.  At time of d/c pt will return to previous independent living.  Denies any needs at this time.  PCP office contacted pt while CM in room, f/u appt 3/9 @ 10:30AM.  Will send d/c summary via [employer].    Plan for possible d/c to home today pending Echo/CTA results.    Discharge planning brochure provided. White board updated with CM name & contact info.  Pt encouraged to call with any questions or needs. CM will continue to follow patient throughout the transitions of care, and assist with any discharge needs.       03/02/20 1434   Discharge Assessment   Assessment Type Discharge Planning Assessment   Confirmed/corrected address and phone number on facesheet? Yes   Assessment information obtained from? Patient   Expected Length of Stay (days) 2   Communicated expected length of stay with patient/caregiver yes   Prior to hospitilization cognitive status: Alert/Oriented   Prior to hospitalization functional status: Independent   Current cognitive status: Alert/Oriented   Current Functional Status: Independent   Lives With spouse   Is patient able to care for self after discharge? Yes   Readmission Within the Last 30 Days no previous admission in last 30 days   Patient currently being followed by outpatient case management? No   Patient currently receives any other outside agency services? No   Equipment Currently Used at Home none   Do you have any problems affording any of your prescribed medications? No   Is the patient taking medications as prescribed? yes   Does the patient have transportation home? Yes   Transportation Anticipated family or friend will provide   Does the patient receive services at the [facility]? No   Discharge Plan A Home   Discharge Plan B Home with family   DME Needed Upon Discharge  none   Patient/Family in Agreement with Plan yes       Nathalia Solano RN    [phone]

## 2020-03-02 NOTE — CONSULTS
"  [facility]  Adult Nutrition  Consult Note    SUMMARY     Recommendations    Recommendation:   1. Change diet to low Na.   2. Encourage good intake at meals    Goals:   Pt will consume at least 50-75% intake at meals  Nutrition Goal Status: new  Communication of RD Recs: reviewed with RN    Reason for Assessment  Reason For Assessment: consult(assess dietary needs)  Diagnosis: (transient neurological symptoms)  Relevant Medical History: HTN  General Information Comments: pt on Regular diet with good intake at meals. NFPE completed today 3/2-nourished at this time  Nutrition Discharge Planning: pt to d/c on low Na diet    Nutrition Risk Screen  Nutrition Risk Screen: no indicators present    Nutrition/Diet History  Food Preferences: no Latter-day or cultural food prefs identified  Spiritual, Cultural Beliefs, Alevism Practices, Values that Affect Care: no  Factors Affecting Nutritional Intake: None identified at this time    Anthropometrics  Temp: 96.7 °F (35.9 °C)  Height Method: Stated  Height: 5' 8.5" (174 cm)  Height (inches): 68.5 in  Weight Method: Bed Scale  Weight: 89.3 kg (196 lb 13.9 oz)  Weight (lb): 196.87 lb  Ideal Body Weight (IBW), Male: 157 lb  % Ideal Body Weight, Male (lb): 125.39 %  BMI (Calculated): 29.5  BMI Grade: 25 - 29.9 - overweight     Lab/Procedures/Meds  Pertinent Labs Reviewed: reviewed  Pertinent Labs Comments: Alb 3.1L  Pertinent Medications Reviewed: reviewed  Pertinent Medications Comments: aspirin, pantoprazole    Estimated/Assessed Needs  Weight Used For Calorie Calculations: 89.3 kg (196 lb 13.9 oz)  Energy Calorie Requirements (kcal): 2047  Energy Need Method: Snyder-St Jeor(x1.3)  Protein Requirements: 71g (0.8g/kg)  Weight Used For Protein Calculations: 89.3 kg (196 lb 13.9 oz)  Estimated Fluid Requirement Method: RDA Method  RDA Method (mL): 2047     Nutrition Prescription Ordered  Current Diet Order: Regular    Evaluation of Received Nutrient/Fluid " Intake  I/O: 800/0  Energy Calories Required: meeting needs  Protein Required: meeting needs  Fluid Required: meeting needs  Comments: LBM 3/1  % Intake of Estimated Energy Needs: 75 - 100 %  % Meal Intake: 75 - 100 %    Nutrition Risk  Level of Risk/Frequency of Follow-up: (1xweekly)     Assessment and Plan  No nutrition dx at this time     Monitor and Evaluation  Food and Nutrient Intake: food and beverage intake  Food and Nutrient Adminstration: diet order  Physical Activity and Function: nutrition-related ADLs and IADLs  Anthropometric Measurements: weight  Biochemical Data, Medical Tests and Procedures: electrolyte and renal panel  Nutrition-Focused Physical Findings: overall appearance     Malnutrition Assessment  Subcutaneous Fat Loss (Final Summary): well nourished  Muscle Loss Evaluation (Final Summary): well nourished       Nutrition Follow-Up  RD Follow-up?: Yes

## 2020-03-02 NOTE — PLAN OF CARE
Plan of care reviewed with patient and family.  Patient verbalized understanding and had no further questions.  Patient completed 2D echo, and is currently awaiting CT results.  The CT results are pending patient discharge.  Patient is a,a,ox4, VSS, and has no complaints at this time.  Patient now resting comfortably in bed locked in lowest position, side rails up x3, and call bell in reach. Will continue to monitor.

## 2020-03-02 NOTE — NURSING
Cued into patient's room with permission. Family at bedside. In supine position with head of bed elevated. Bed alarm is activated and is maintained for safety. Time allowed for questions. Education done on fall precautions and treatment plan of echo in am discussed.  Verbalized understanding. Will continue to be available as needed to assist with patient care

## 2020-03-02 NOTE — PT/OT/SLP EVAL
Physical Therapy Evaluation/Treatment/Discharge    Patient Name:  Papo Chopra   MRN:  [id number]    Recommendations:     Discharge Recommendations:  home   Discharge Equipment Recommendations: none   Barriers to discharge: None    Assessment:     Papo Chopra is a 84 y.o. male admitted with a medical diagnosis of Transient neurological symptoms. At this time, pt is functioning at his prior level of function and does not require further acute PT services    Recent Surgery: * No surgery found *      Plan:     During this hospitalization, patient does not require further acute PT services; please re-consult if condition changes.    · Plan of Care Expires:  03/01/20    Subjective     Chief Complaint: none  Patient/Family Comments/goals: wants to get OOB and walk    Pain/Comfort:  · Pain Rating 1: 0/10  · Pain Rating Post-Intervention 1: 0/10    Patients cultural, spiritual, Shinto conflicts given the current situation: no    Living Environment:  Pt lives with wife and daughter in [facility] with TH entry; walk n shower with built n seat, grab bars and raised toilet with grab bars.    Previous level of function: Independent ADLS, ambulation, cares for wife in , assists her with all ADLS and lifting assist transfers; Pt drives and does IADLS.  Roles and Routines: active, likes to fish.  Equipment Used at home:  shower chair, grab bar, raised toilet  Assistance upon Discharge: family and currently in process of getting caregiver assistance for sick wife.    Objective:     Communicated with nurse prior to session.  Patient found with bed alarm, telemetry  upon PT entry to room.    General Precautions: Standard, fall   Orthopedic Precautions:N/A   Braces: N/A     Exams:  · Cognitive Exam:  Patient is oriented to Person, Place, Time, Situation and follows 2 step commands  · Fine Motor Coordination:    · -       Intact  · Gross Motor Coordination:  WFL  · Postural Exam:  Patient presented with the following  abnormalities:    · -       Rounded shoulders  · Sensation:    · -       Intact grossly; reports tingling in finger tips and toes; finger tips purplish in color with hand hanging down  · RLE ROM: WFL  · RLE Strength: WFL~4+  · LLE ROM: WFL  · LLE Strength: WFL~4+    Functional Mobility:  · Bed Mobility:     · Rolling Right: independence  · Scooting: independence  · Supine to Sit: independence  · Sit to Supine: independence  · Transfers:     · Sit to Stand:  independence with no AD  · Toilet Transfer: independence with  no AD  using  Step Transfer  · Gait: Patient amb 200ft in bauer, initially had slight deviation to L but able to recover and then amb without LOB; able to amb forward, backward, side stepping, 180*turns, 360* turns, quick stops without LOB; also amb with min to mod perturbations with good compensatory movements  · Balance:  Sit static and dynamic~good; static stand~good; dynamic stand/amb~good/good-; pt able to perform functional reaching in all quadrants without LOB; able to  item off floor without LOB; able to stand with eyes closed without LOB x 10 sec; good compensatory movements with perturbations statically      Therapeutic Activities and Exercises:   Pt/fam educated on role of PT/POC; pt/fam educated on general home safety; use of shower chair, night light when getting up to go to bathroom at night in particular.    AM-PAC 6 CLICK MOBILITY  Total Score:24     Patient left HOB elevated with all lines intact, call button in reach, bed alarm on, nurse notified and family present.    GOALS:   Multidisciplinary Problems     Physical Therapy Goals     Not on file          Multidisciplinary Problems (Resolved)        Problem: Physical Therapy Goal    Goal Priority Disciplines Outcome Goal Variances Interventions   Physical Therapy Goal   (Resolved)     PT, PT/OT Met                     History:     Past Medical History:   Diagnosis Date    Hypertension        History reviewed. No pertinent  surgical history.    Time Tracking:     PT Received On: 03/01/20  PT Start Time: 1525     PT Stop Time: 1557  PT Total Time (min): 32 min with OT    Billable Minutes: Evaluation 20 minutes  and Therapeutic Activity  10 minutes      Maureen Thomas, PT  03/01/2020

## 2020-03-02 NOTE — PROGRESS NOTES
Progress Note  Neurology    Admit Date: 2/29/2020  LOS: 1    CC: Transient neurological symptoms    SUBJECTIVE:     Interval History/Significant Events: Discussed presentation with family. Pt does not remember the events that brought him to the hospital. He remembers feeling tired on Friday, but can not remember word finding difficulties or strange feeling on Saturday prior to presentation.     Review of Symptoms:   Constitutional: Denies fevers or chills.  Pulmonary: Denies shortness of breath or cough.  Cardiology: Denies chest pain or palpitations.  GI: Denies abdominal pain or constipation.  Neurologic: Denies new weakness, headaches, or paresthesias.     Medications:    Scheduled Meds:   aspirin  81 mg Oral Daily    clopidogreL  75 mg Oral Daily    docusate sodium  100 mg Oral Daily    enoxaparin  40 mg Subcutaneous Daily    guaiFENesin  600 mg Oral BID    pantoprazole  40 mg Oral Daily    polyethylene glycol  17 g Oral Daily     PRN Meds:.acetaminophen, labetalol, ondansetron, sodium chloride 0.9%    OBJECTIVE:     Physical Exam:    Vital Signs (24h Range):   Temp:  [97.7 °F (36.5 °C)-99.1 °F (37.3 °C)] 97.7 °F (36.5 °C)  Pulse:  [58-73] 65  Resp:  [2-20] 20  SpO2:  [92 %-97 %] 95 %  BP: (130-163)/(61-71) 158/71    Mental Status  Orientation: alert and oriented to person, place, time and situation, memory intact  Language: good fluency, no aphasia or dysarthria    Cranial Nerves  Visual acuity grossly intact, PERRLA, EOMI, V1-V3 subjectively intact, smile symmetric, pt closes eyes symmetrically, hearing grossly intact, palate elevates symmetrically, uvula midline, SCM and trapezius 5/5 bilaterally, tongue protrudes midline    Motor  Normal tone and bulk. No fasciculations.  LUE 5/5  LLE 5/5  RUE 5/5  RLE 5/5  DTRs 1+  Throughout    Sensory  Light touch intact throughout    Cerebellar  Finger to nose intact bilaterally  Heel to shin intact bilaterally    Gait  deferred    Labs:    BMP:  Recent Labs   Lab  03/02/20  0455   MG 1.9   PHOS 2.8       LFT:   Lab Results   Component Value Date    AST 20 03/01/2020    ALT 11 03/01/2020    ALKPHOS 69 03/01/2020    BILITOT 0.9 03/01/2020    ALBUMIN 3.1 (L) 03/01/2020    PROT 6.8 03/01/2020       CBC:   Lab Results   Component Value Date    WBC 4.59 03/02/2020    HGB 13.7 (L) 03/02/2020    HCT 41.8 03/02/2020    MCV 87 03/02/2020     03/02/2020       Microbiology x 7d:   Microbiology Results (last 7 days)     ** No results found for the last 168 hours. **          Imaging:  MRI - negative    ASSESSMENT/PLAN:     85 Y/O with history of HTN who presented with slurred speech, word finding difficulties and some unsteadiness that resolved in 15 minutes. Pt denies being able to remember this, which makes me concerned for possible seizure vs. TIA.     TIA vs. Seizure    - MRI negative for acute process, chronic microvascualr disease   - ABCD2- 5, moderate risk for stroke  - cont ASA 81 daily. No need for plavix as MRI is negative for stroke.   - please start atorvastatin 40 mg. LDL LDL- 96.8, HDL- 37  - Ruj7w-8.8, F/U with PCP, goal BG<140  - TSH, ESR, Folate & B12 normal, RPR negative  - PT/OT/SLP  - pending CTA head & neck, TTE  - recommend EEG which may be done as outpatient. Would not start AEDs at this time.      Debora Leggett MD  [employer] [facility], PGY-IV     Case discussed with staff.

## 2020-03-02 NOTE — PLAN OF CARE
Progress notes reviewed. Rounds completed. Introduced self as VN for this shift. Educated pt on VN's role in patient's care.  MD in room assessing patient. VN to continue to monitor.      UpdaTE: patient eager to discharge, message sent to MD for update on plan of care.

## 2020-03-02 NOTE — PLAN OF CARE
Recommendation:   1. Change diet to low Na.   2. Encourage good intake at meals    Goals:   Pt will consume at least 50-75% intake at meals  Nutrition Goal Status: new  Communication of RD Recs: reviewed with RN

## 2020-03-02 NOTE — PLAN OF CARE
Problem: Physical Therapy Goal  Goal: Physical Therapy Goal  Outcome: Met   PT eval completed; pt at baseline of function for functional amb and ADLs without AD and no notable residual effects; will d/c acute PT services; re-consult if condition changes.

## 2020-03-02 NOTE — PLAN OF CARE
Plan of care reviewed with patient, understanding verbalized.  Pt remains SR on tele. Neuro checks q4.  Bed alarm on, call light in reach, fall precautions in place.

## 2020-03-02 NOTE — NURSING
Patient arrived to floor on wheelchair with transport from Echo lab.  Patient complaining of itching with minimal hives on trunk and upper extremities.  Patient's vital signs within normal limits, and only symptoms are itching.  Physician notified, Dr. Hines will come and see patient.  Will continue to monitor.

## 2020-03-03 VITALS
BODY MASS INDEX: 29.29 KG/M2 | WEIGHT: 197.75 LBS | HEIGHT: 69 IN | TEMPERATURE: 96 F | OXYGEN SATURATION: 95 % | RESPIRATION RATE: 19 BRPM | HEART RATE: 91 BPM | DIASTOLIC BLOOD PRESSURE: 78 MMHG | SYSTOLIC BLOOD PRESSURE: 173 MMHG

## 2020-03-03 LAB
BASOPHILS # BLD AUTO: 0.01 K/UL (ref 0–0.2)
BASOPHILS NFR BLD: 0.3 % (ref 0–1.9)
DIFFERENTIAL METHOD: ABNORMAL
EOSINOPHIL # BLD AUTO: 0 K/UL (ref 0–0.5)
EOSINOPHIL NFR BLD: 0.3 % (ref 0–8)
ERYTHROCYTE [DISTWIDTH] IN BLOOD BY AUTOMATED COUNT: 12.2 % (ref 11.5–14.5)
HCT VFR BLD AUTO: 45 % (ref 40–54)
HGB BLD-MCNC: 14.7 G/DL (ref 14–18)
IMM GRANULOCYTES # BLD AUTO: 0.01 K/UL (ref 0–0.04)
IMM GRANULOCYTES NFR BLD AUTO: 0.3 % (ref 0–0.5)
LYMPHOCYTES # BLD AUTO: 0.8 K/UL (ref 1–4.8)
LYMPHOCYTES NFR BLD: 23.5 % (ref 18–48)
MAGNESIUM SERPL-MCNC: 2 MG/DL (ref 1.6–2.6)
MCH RBC QN AUTO: 28.4 PG (ref 27–31)
MCHC RBC AUTO-ENTMCNC: 32.7 G/DL (ref 32–36)
MCV RBC AUTO: 87 FL (ref 82–98)
MONOCYTES # BLD AUTO: 0.1 K/UL (ref 0.3–1)
MONOCYTES NFR BLD: 3 % (ref 4–15)
NEUTROPHILS # BLD AUTO: 2.4 K/UL (ref 1.8–7.7)
NEUTROPHILS NFR BLD: 72.6 % (ref 38–73)
NRBC BLD-RTO: 0 /100 WBC
PHOSPHATE SERPL-MCNC: 3.1 MG/DL (ref 2.7–4.5)
PLATELET # BLD AUTO: 156 K/UL (ref 150–350)
PMV BLD AUTO: 11.4 FL (ref 9.2–12.9)
RBC # BLD AUTO: 5.17 M/UL (ref 4.6–6.2)
WBC # BLD AUTO: 3.36 K/UL (ref 3.9–12.7)

## 2020-03-03 PROCEDURE — 96361 HYDRATE IV INFUSION ADD-ON: CPT | Performed by: HOSPITALIST

## 2020-03-03 PROCEDURE — 83735 ASSAY OF MAGNESIUM: CPT

## 2020-03-03 PROCEDURE — 85025 COMPLETE CBC W/AUTO DIFF WBC: CPT

## 2020-03-03 PROCEDURE — 63600175 PHARM REV CODE 636 W HCPCS: Performed by: NURSE PRACTITIONER

## 2020-03-03 PROCEDURE — S0028 INJECTION, FAMOTIDINE, 20 MG: HCPCS | Performed by: NURSE PRACTITIONER

## 2020-03-03 PROCEDURE — 96376 TX/PRO/DX INJ SAME DRUG ADON: CPT | Performed by: HOSPITALIST

## 2020-03-03 PROCEDURE — 25000003 PHARM REV CODE 250: Performed by: HOSPITALIST

## 2020-03-03 PROCEDURE — 96375 TX/PRO/DX INJ NEW DRUG ADDON: CPT | Performed by: HOSPITALIST

## 2020-03-03 PROCEDURE — 94761 N-INVAS EAR/PLS OXIMETRY MLT: CPT

## 2020-03-03 PROCEDURE — 84100 ASSAY OF PHOSPHORUS: CPT

## 2020-03-03 PROCEDURE — 36415 COLL VENOUS BLD VENIPUNCTURE: CPT

## 2020-03-03 PROCEDURE — 25000003 PHARM REV CODE 250: Performed by: NURSE PRACTITIONER

## 2020-03-03 PROCEDURE — G0378 HOSPITAL OBSERVATION PER HR: HCPCS

## 2020-03-03 RX ORDER — ATORVASTATIN CALCIUM 40 MG/1
40 TABLET, FILM COATED ORAL DAILY
Qty: 90 TABLET | Refills: 3 | Status: SHIPPED | OUTPATIENT
Start: 2020-03-03 | End: 2021-03-03

## 2020-03-03 RX ORDER — ASPIRIN 81 MG/1
81 TABLET ORAL DAILY
Qty: 90 TABLET | Refills: 3 | Status: SHIPPED | OUTPATIENT
Start: 2020-03-04 | End: 2021-03-04

## 2020-03-03 RX ADMIN — DIPHENHYDRAMINE HYDROCHLORIDE 25 MG: 50 INJECTION, SOLUTION INTRAMUSCULAR; INTRAVENOUS at 11:03

## 2020-03-03 RX ADMIN — FAMOTIDINE 20 MG: 10 INJECTION, SOLUTION INTRAVENOUS at 08:03

## 2020-03-03 RX ADMIN — PANTOPRAZOLE SODIUM 40 MG: 40 TABLET, DELAYED RELEASE ORAL at 08:03

## 2020-03-03 RX ADMIN — METHYLPREDNISOLONE SODIUM SUCCINATE 125 MG: 125 INJECTION, POWDER, FOR SOLUTION INTRAMUSCULAR; INTRAVENOUS at 05:03

## 2020-03-03 RX ADMIN — DIPHENHYDRAMINE HYDROCHLORIDE 25 MG: 50 INJECTION, SOLUTION INTRAMUSCULAR; INTRAVENOUS at 05:03

## 2020-03-03 RX ADMIN — SODIUM CHLORIDE: 0.9 INJECTION, SOLUTION INTRAVENOUS at 08:03

## 2020-03-03 RX ADMIN — METHYLPREDNISOLONE SODIUM SUCCINATE 125 MG: 125 INJECTION, POWDER, FOR SOLUTION INTRAMUSCULAR; INTRAVENOUS at 12:03

## 2020-03-03 RX ADMIN — METHYLPREDNISOLONE SODIUM SUCCINATE 125 MG: 125 INJECTION, POWDER, FOR SOLUTION INTRAMUSCULAR; INTRAVENOUS at 11:03

## 2020-03-03 RX ADMIN — ASPIRIN 81 MG: 81 TABLET, COATED ORAL at 08:03

## 2020-03-03 NOTE — SUBJECTIVE & OBJECTIVE
Interval History:MRI negative, back to baseline  aweaiting cta and 2 d echo, dc plavix from med list    Review of Systems   Constitutional: Negative for activity change.   Respiratory: Negative for shortness of breath and stridor.    Cardiovascular: Negative for leg swelling.   Gastrointestinal: Negative for diarrhea, nausea and vomiting.   Neurological: Negative for dizziness, facial asymmetry, weakness, numbness and headaches.     Objective:     Vital Signs (Most Recent):  Temp: 97.8 °F (36.6 °C) (03/02/20 1656)  Pulse: 85 (03/02/20 1656)  Resp: 14 (03/02/20 1656)  BP: (!) 167/77 (03/02/20 1656)  SpO2: 96 % (03/02/20 1656) Vital Signs (24h Range):  Temp:  [96.7 °F (35.9 °C)-99.1 °F (37.3 °C)] 97.8 °F (36.6 °C)  Pulse:  [58-85] 85  Resp:  [2-20] 14  SpO2:  [92 %-97 %] 96 %  BP: (135-167)/(61-77) 167/77     Weight: 89.3 kg (196 lb 13.9 oz)  Body mass index is 29.5 kg/m².    Intake/Output Summary (Last 24 hours) at 3/2/2020 1819  Last data filed at 3/2/2020 1700  Gross per 24 hour   Intake 1550 ml   Output --   Net 1550 ml      Physical Exam   Constitutional: He is oriented to person, place, and time. He appears well-developed and well-nourished.   HENT:   Head: Normocephalic and atraumatic.   Eyes: EOM are normal.   Neck: Normal range of motion. Neck supple.   Cardiovascular: Normal rate.   Pulmonary/Chest: Effort normal.   Abdominal: Soft.   Musculoskeletal: Normal range of motion. He exhibits no edema.   Neurological: He is alert and oriented to person, place, and time.   Psychiatric: He has a normal mood and affect. His behavior is normal. Judgment and thought content normal.   Nursing note and vitals reviewed.      Significant Labs:   Recent Labs   Lab 02/29/20  1052 03/01/20  0501 03/02/20  0455   WBC 4.76 4.51 4.59   HGB 14.9 13.6* 13.7*   HCT 45.7 41.9 41.8    142* 152     Recent Labs   Lab 02/29/20  1048 02/29/20  1052 03/01/20  0501 03/02/20  0455   NA  --  141 139  --    K  --  4.2 4.3  --    CL  --   103 103  --    CO2  --  30* 30*  --    BUN  --  17 16  --    CREATININE  --  0.9 0.9  --    GLU  --  114* 80  --    CALCIUM  --  9.3 8.7  --    MG 1.9  --  2.0 1.9   PHOS 2.5*  --  3.0 2.8     Recent Labs   Lab 02/29/20  1052 03/01/20  0501 03/01/20  0502   ALKPHOS 81 69  --    ALT 13 11  --    AST 19 20  --    ALBUMIN 3.6 3.1*  --    PROT 7.4 6.8  --    BILITOT 1.3* 0.9  --    INR 1.0  --  1.0      Recent Labs     02/29/20  1052 03/01/20  0501 03/01/20  0502   CPK 91  91 94  --    CPKMB 1.4 1.4  --    MB 1.5 1.5  --    TROPONINI 0.019  --  0.019     No results for input(s): POCTGLUCOSE in the last 168 hours.  Hemoglobin A1C   Date Value Ref Range Status   02/29/2020 5.8 (H) 4.0 - 5.6 % Final     Comment:     ADA Screening Guidelines:  5.7-6.4%  Consistent with prediabetes  >or=6.5%  Consistent with diabetes  High levels of fetal hemoglobin interfere with the HbA1C  assay. Heterozygous hemoglobin variants (HbS, HgC, etc)do  not significantly interfere with this assay.   However, presence of multiple variants may affect accuracy.       Scheduled Meds:   aspirin  81 mg Oral Daily    clopidogreL  75 mg Oral Daily    docusate sodium  100 mg Oral Daily    enoxaparin  40 mg Subcutaneous Daily    guaiFENesin  600 mg Oral BID    pantoprazole  40 mg Oral Daily    polyethylene glycol  17 g Oral Daily     Continuous Infusions:  As Needed: acetaminophen, labetalol, ondansetron, sodium chloride 0.9%    Significant Imaging:   Will f/u on 2 d echoI and CTA head/neck

## 2020-03-03 NOTE — NURSING
Patient again experiencing hives on trunk and upper extremities.  Patient's vital signs within normal limits, and only complaint is itching.  MD notified, no new orders at this time.  Will continue to monitor.

## 2020-03-03 NOTE — DISCHARGE SUMMARY
[facility] Medicine  Discharge Summary      Patient Name: Papo Chopra  MRN: [id number]  Admission Date: 2/29/2020  Hospital Length of Stay: 1 days  Discharge Date and Time: No discharge date for patient encounter.  Attending Physician: Angel Shin DO   Discharging Provider: Angel Shin DO  Primary Care Provider: Marvel Lemon MD      HPI:   84 y.o. male with past medical history of hypertension presented to [facility] ED with complaints of slurred speech.  He was awaken this morning by his daughter and was noted to have slurred speech and disorientation, went back to sleep, woke up 10 minutes later, then woke up again and was wobbly on his feet. He does not recall going to urgent care. He woke at 7:30am with these symptoms. Yesterday he was disoriented and not feeling well. Denies similar past episodes, aggravating or alleviating factors. Patient transferred to [facility] and admitted to [facility] medicine observation services, under my care, for further stroke workup and medical management.          * No surgery found *      Hospital Course:   3/1 pt seen, feeling ok, no slurred speech appreciated and no weaknesses. Feels back to vaseline, neurology evaluated pt, wants stat MRI and CTA. Spoke with house supervisor and on call radiologist, all is set for the imaging today  3/2 pt seen, at his baseline, neuro still wanting to evaluate the cta neck/brain, resultas pending, likely dc home tomorrow with outpatient eeg  3/3 cta is negative MRI is negative, pt is symptoms free, neurology recommended asa and lipitor ok to dc home with outpatient f/u with PCP and neurology for outpatient EEG     Consults:   Consults (From admission, onward)        Status Ordering Provider     Inpatient consult to Neurology  Once     Provider:  (Not yet assigned)    Completed ANGEL SHIN     Inpatient consult to Registered Dietitian/Nutritionist  Once     Provider:  (Not yet  assigned)    Completed FLORIDALMA CHANDLER     Inpatient consult to Social Work/Case Management  Once     Provider:  (Not yet assigned)    Acknowledged SHANNON SHIN     IP consult to case management/social work  Once     Provider:  (Not yet assigned)    Acknowledged SHANNON SHIN          * Transient neurological symptoms  TIA (transient ischemic attack)  Slurred speech    -Continuous cardiac monitoring   -Troponin: WNL  -tele vascular consult complete and recommend workup for infection/metabolic disturbances. If no e/o of underlying condition, vessel imaging with CTA or MRA can be considered to r/o culprit of atheroembolism or focal hypoperfusion.Recommend aspirin 81 mg until workup completed.  -CT of head: no acute intra cranial processes  -MRI of head: pending  -MRA of head and Neck: pending  -LDL: 96.8  -U/A: negative for UTI  -TSH: WNL  -B-12, Folate, RPR: pending  -Permissive HTN  -2D echo: pending  -Neuro checks Q4hrs  -PT/OT/SP evaluation  3/1 stable, no deficit  Await MRI and CTA results  3/2 neg MRI  Dc plavix  Await cta and echo results   eeg as outpatient  3/3 negative cta, negative MRI.  Dc home on ASA and lipitor  F/u PCP  F/U neurology for outpatient EEG      Slurred speech  3/1 not evident on my examiniation.  F/u neuro input  F/u imaging  3/2 resolved  3/3 no reoocurrance    Hypertension, benign  Permissive HTN  Labetalol prn  Monitor  3/1 permissive htn  Monitor  3/2 contolled  3/3 -170/83   Pt is anxious to leave.  Monitor as outpatient, report BP to PCP      Final Active Diagnoses:    Diagnosis Date Noted POA    PRINCIPAL PROBLEM:  Transient neurological symptoms [R29.818] 02/29/2020 Yes    Slurred speech [R47.81] 02/29/2020 Yes    Hypertension, benign [I10] 02/29/2020 Yes    Allergic to IV contrast [Z91.041] 03/02/2020 No    TIA (transient ischemic attack) [G45.9] 02/29/2020 Yes      Problems Resolved During this Admission:       Discharged Condition: stable    Disposition: Home or  Self Care    Follow Up:  Follow-up Information     Marvel Lemon MD On 3/9/2020.    Specialty:  Family Medicine  Why:  10:30 AM  Contact information:  [address] [facility] [address]  [phone]             Marvel Lemon MD.    Specialty:  Family Medicine  Contact information:  [address] [facility] [address]  [phone]                 Patient Instructions:      Ambulatory referral/consult to Neurology   Standing Status: Future   Referral Priority: Routine Referral Type: Consultation   Referral Reason: Specialty Services Required   Requested Specialty: Neurology   Number of Visits Requested: 1     Diet Cardiac     Notify your health care provider if you experience any of the following:  persistent nausea and vomiting or diarrhea     Notify your health care provider if you experience any of the following:  difficulty breathing or increased cough     Notify your health care provider if you experience any of the following:  severe persistent headache     Notify your health care provider if you experience any of the following:  persistent dizziness, light-headedness, or visual disturbances     Notify your health care provider if you experience any of the following:  increased confusion or weakness     Activity as tolerated       Significant Diagnostic Studies: Labs:   BMP:   Recent Labs   Lab 03/02/20  0455 03/03/20 0527   MG 1.9 2.0   , CMP No results for input(s): NA, K, CL, CO2, GLU, BUN, CREATININE, CALCIUM, PROT, ALBUMIN, BILITOT, ALKPHOS, AST, ALT, ANIONGAP, ESTGFRAFRICA, EGFRNONAA in the last 48 hours. and CBC   Recent Labs   Lab 03/02/20 0455 03/03/20 0527   WBC 4.59 3.36*   HGB 13.7* 14.7   HCT 41.8 45.0    156     Negative MRI  Negative CTA neck  WNL 2 D echo'/    Pending Diagnostic Studies:     Procedure Component Value Units Date/Time    Echo Color Flow Doppler? Yes [[id number]]     Order Status:  Sent Lab Status:  No result          Medications:  Reconciled Home Medications:      Medication  List      START taking these medications    aspirin 81 MG EC tablet  Commonly known as:  ECOTRIN  Take 1 tablet (81 mg total) by mouth once daily.  Start taking on:  March 4, 2020     atorvastatin 40 MG tablet  Commonly known as:  LIPITOR  Take 1 tablet (40 mg total) by mouth once daily.        CONTINUE taking these medications    atenoloL 50 MG tablet  Commonly known as:  TENORMIN     KRILL OIL ORAL  Take 1 tablet by mouth once daily.     OCUVITE EYE PLUS MULTI ORAL  Take 1 tablet by mouth once daily.     Stool Softener 100 MG capsule  Generic drug:  docusate sodium  Take 100 mg by mouth once daily.            Indwelling Lines/Drains at time of discharge:   Lines/Drains/Airways     None                 Time spent on the discharge of patient: 37 minutes  Patient was seen and examined on the date of discharge and determined to be suitable for discharge.         Angel Hines DO  Department of Hospital Medicine  [facility]

## 2020-03-03 NOTE — PROGRESS NOTES
[facility]  Progress Note    Patient Name: Papo Chopra  MRN: [id number]  Patient Class: OP- Observation   Admission Date: 2/29/2020  Length of Stay: 1 days  Attending Physician: Angel Hines DO  Primary Care Provider: Marvel Lemon MD        Subjective:     Principal Problem:Transient neurological symptoms        HPI:  84 y.o. male with past medical history of hypertension presented to [facility] ED with complaints of slurred speech.  He was awaken this morning by his daughter and was noted to have slurred speech and disorientation, went back to sleep, woke up 10 minutes later, then woke up again and was wobbly on his feet. He does not recall going to urgent care. He woke at 7:30am with these symptoms. Yesterday he was disoriented and not feeling well. Denies similar past episodes, aggravating or alleviating factors. Patient transferred to [facility] and admitted to [facility] observation services, under my care, for further stroke workup and medical management.          Overview/Hospital Course:  3/1 pt seen, feeling ok, no slurred speech appreciated and no weaknesses. Feels back to vaseline, neurology evaluated pt, wants stat MRI and CTA. Spoke with house supervisor and on call radiologist, all is set for the imaging today  3/2 pt seen, at his baseline, neuro still wanting to evaluate the cta neck/brain, resultas pending, likely dc home tomorrow with outpatient eeg    Interval History:MRI negative, back to baseline  aweaiting cta and 2 d echo, dc plavix from med list    Review of Systems   Constitutional: Negative for activity change.   Respiratory: Negative for shortness of breath and stridor.    Cardiovascular: Negative for leg swelling.   Gastrointestinal: Negative for diarrhea, nausea and vomiting.   Neurological: Negative for dizziness, facial asymmetry, weakness, numbness and headaches.     Objective:     Vital Signs (Most Recent):  Temp:  97.8 °F (36.6 °C) (03/02/20 1656)  Pulse: 85 (03/02/20 1656)  Resp: 14 (03/02/20 1656)  BP: (!) 167/77 (03/02/20 1656)  SpO2: 96 % (03/02/20 1656) Vital Signs (24h Range):  Temp:  [96.7 °F (35.9 °C)-99.1 °F (37.3 °C)] 97.8 °F (36.6 °C)  Pulse:  [58-85] 85  Resp:  [2-20] 14  SpO2:  [92 %-97 %] 96 %  BP: (135-167)/(61-77) 167/77     Weight: 89.3 kg (196 lb 13.9 oz)  Body mass index is 29.5 kg/m².    Intake/Output Summary (Last 24 hours) at 3/2/2020 1819  Last data filed at 3/2/2020 1700  Gross per 24 hour   Intake 1550 ml   Output --   Net 1550 ml      Physical Exam   Constitutional: He is oriented to person, place, and time. He appears well-developed and well-nourished.   HENT:   Head: Normocephalic and atraumatic.   Eyes: EOM are normal.   Neck: Normal range of motion. Neck supple.   Cardiovascular: Normal rate.   Pulmonary/Chest: Effort normal.   Abdominal: Soft.   Musculoskeletal: Normal range of motion. He exhibits no edema.   Neurological: He is alert and oriented to person, place, and time.   Psychiatric: He has a normal mood and affect. His behavior is normal. Judgment and thought content normal.   Nursing note and vitals reviewed.      Significant Labs:   Recent Labs   Lab 02/29/20  1052 03/01/20  0501 03/02/20  0455   WBC 4.76 4.51 4.59   HGB 14.9 13.6* 13.7*   HCT 45.7 41.9 41.8    142* 152     Recent Labs   Lab 02/29/20  1048 02/29/20  1052 03/01/20  0501 03/02/20  0455   NA  --  141 139  --    K  --  4.2 4.3  --    CL  --  103 103  --    CO2  --  30* 30*  --    BUN  --  17 16  --    CREATININE  --  0.9 0.9  --    GLU  --  114* 80  --    CALCIUM  --  9.3 8.7  --    MG 1.9  --  2.0 1.9   PHOS 2.5*  --  3.0 2.8     Recent Labs   Lab 02/29/20  1052 03/01/20  0501 03/01/20 0502   ALKPHOS 81 69  --    ALT 13 11  --    AST 19 20  --    ALBUMIN 3.6 3.1*  --    PROT 7.4 6.8  --    BILITOT 1.3* 0.9  --    INR 1.0  --  1.0      Recent Labs     02/29/20  1052 03/01/20  0501 03/01/20 0502   CPK 91  91 94  --     CPKMB 1.4 1.4  --    MB 1.5 1.5  --    TROPONINI 0.019  --  0.019     No results for input(s): POCTGLUCOSE in the last 168 hours.  Hemoglobin A1C   Date Value Ref Range Status   02/29/2020 5.8 (H) 4.0 - 5.6 % Final     Comment:     ADA Screening Guidelines:  5.7-6.4%  Consistent with prediabetes  >or=6.5%  Consistent with diabetes  High levels of fetal hemoglobin interfere with the HbA1C  assay. Heterozygous hemoglobin variants (HbS, HgC, etc)do  not significantly interfere with this assay.   However, presence of multiple variants may affect accuracy.       Scheduled Meds:   aspirin  81 mg Oral Daily    clopidogreL  75 mg Oral Daily    docusate sodium  100 mg Oral Daily    enoxaparin  40 mg Subcutaneous Daily    guaiFENesin  600 mg Oral BID    pantoprazole  40 mg Oral Daily    polyethylene glycol  17 g Oral Daily     Continuous Infusions:  As Needed: acetaminophen, labetalol, ondansetron, sodium chloride 0.9%    Significant Imaging:   Will f/u on 2 d echoI and CTA head/neck      Assessment/Plan:      * Transient neurological symptoms  TIA (transient ischemic attack)  Slurred speech    -Continuous cardiac monitoring   -Troponin: WNL  -tele vascular consult complete and recommend workup for infection/metabolic disturbances. If no e/o of underlying condition, vessel imaging with CTA or MRA can be considered to r/o culprit of atheroembolism or focal hypoperfusion.Recommend aspirin 81 mg until workup completed.  -CT of head: no acute intra cranial processes  -MRI of head: pending  -MRA of head and Neck: pending  -LDL: 96.8  -U/A: negative for UTI  -TSH: WNL  -B-12, Folate, RPR: pending  -Permissive HTN  -2D echo: pending  -Neuro checks Q4hrs  -PT/OT/SP evaluation  3/1 stable, no deficit  Await MRI and CTA results  3/2 neg MRI  Dc plavix  Await cta and echo results   eeg as outpatient      Slurred speech  3/1 not evident on my examiniation.  F/u neuro input  F/u imaging  3/2 resolved    Hypertension,  benign  Permissive HTN  Labetalol prn  Monitor  3/1 permissive htn  Monitor  3/2 contolled      VTE Risk Mitigation (From admission, onward)         Ordered     enoxaparin injection 40 mg  Daily      02/29/20 2046     Place sequential compression device  Until discontinued      02/29/20 2049     IP VTE HIGH RISK PATIENT  Once      02/29/20 2046                      Angel Hines DO  Department of Hospital Medicine   [facility]

## 2020-03-03 NOTE — PLAN OF CARE
Pt has no DME or HH needs, daughter available for transport home.    D/C rounds complete. All questions answered.  Nurse to discuss d/c medications.  Discussed need to keep f/u appts, adherence to medication regimen for health maintenance, verbalized understanding.     03/03/20 1441   Final Note   Assessment Type Final Discharge Note   Anticipated Discharge Disposition Home   Hospital Follow Up  Appt(s) scheduled? Yes   Discharge plans and expectations educations in teach back method with documentation complete? Yes   Right Care Referral Info   Post Acute Recommendation No Care       Nathalia Solano, RN    [phone]

## 2020-03-03 NOTE — ASSESSMENT & PLAN NOTE
3/1 not evident on my examiniation.  F/u neuro input  F/u imaging  3/2 resolved  3/3 no reoocurrance

## 2020-03-03 NOTE — ASSESSMENT & PLAN NOTE
TIA (transient ischemic attack)  Slurred speech    -Continuous cardiac monitoring   -Troponin: WNL  -tele vascular consult complete and recommend workup for infection/metabolic disturbances. If no e/o of underlying condition, vessel imaging with CTA or MRA can be considered to r/o culprit of atheroembolism or focal hypoperfusion.Recommend aspirin 81 mg until workup completed.  -CT of head: no acute intra cranial processes  -MRI of head: pending  -MRA of head and Neck: pending  -LDL: 96.8  -U/A: negative for UTI  -TSH: WNL  -B-12, Folate, RPR: pending  -Permissive HTN  -2D echo: pending  -Neuro checks Q4hrs  -PT/OT/SP evaluation  3/1 stable, no deficit  Await MRI and CTA results  3/2 neg MRI  Dc plavix  Await cta and echo results   eeg as outpatient  3/3 negative cta, negative MRI.  Dc home on ASA and lipitor  F/u PCP  F/U neurology for outpatient EEG

## 2020-03-03 NOTE — ASSESSMENT & PLAN NOTE
Permissive HTN  Labetalol prn  Monitor  3/1 permissive htn  Monitor  3/2 contolled  3/3 -170/83   Pt is anxious to leave.  Monitor as outpatient, report BP to PCP

## 2020-03-03 NOTE — NURSING
Patient complaining of itching and Hives.  Faint hives still noted on chest and arms.  Patient's wife showed me a picture from earlier today when it was much worse. Reported to GUZMAN Gonzalez NP and floor nurse.  New orders received.

## 2020-03-23 ENCOUNTER — OFFICE VISIT (OUTPATIENT)
Dept: NEUROLOGY | Facility: CLINIC | Age: 85
End: 2020-03-23
Payer: MEDICARE

## 2020-03-23 VITALS
SYSTOLIC BLOOD PRESSURE: 144 MMHG | HEIGHT: 69 IN | RESPIRATION RATE: 14 BRPM | BODY MASS INDEX: 28.57 KG/M2 | DIASTOLIC BLOOD PRESSURE: 62 MMHG | HEART RATE: 56 BPM | TEMPERATURE: 99 F | WEIGHT: 192.88 LBS

## 2020-03-23 DIAGNOSIS — I10 HYPERTENSION, BENIGN: ICD-10-CM

## 2020-03-23 DIAGNOSIS — R42 DIZZINESS AND GIDDINESS: ICD-10-CM

## 2020-03-23 DIAGNOSIS — R29.818 TRANSIENT NEUROLOGICAL SYMPTOMS: Primary | ICD-10-CM

## 2020-03-23 PROCEDURE — 99999 PR PBB SHADOW E&M-EST. PATIENT-LVL III: CPT | Mod: PBBFAC,,, | Performed by: PSYCHIATRY & NEUROLOGY

## 2020-03-23 PROCEDURE — 3078F DIAST BP <80 MM HG: CPT | Mod: CPTII,S$GLB,, | Performed by: PSYCHIATRY & NEUROLOGY

## 2020-03-23 PROCEDURE — 99215 OFFICE O/P EST HI 40 MIN: CPT | Mod: S$GLB,,, | Performed by: PSYCHIATRY & NEUROLOGY

## 2020-03-23 PROCEDURE — 3077F SYST BP >= 140 MM HG: CPT | Mod: CPTII,S$GLB,, | Performed by: PSYCHIATRY & NEUROLOGY

## 2020-03-23 PROCEDURE — 99999 PR PBB SHADOW E&M-EST. PATIENT-LVL III: ICD-10-PCS | Mod: PBBFAC,,, | Performed by: PSYCHIATRY & NEUROLOGY

## 2020-03-23 PROCEDURE — 1126F AMNT PAIN NOTED NONE PRSNT: CPT | Mod: S$GLB,,, | Performed by: PSYCHIATRY & NEUROLOGY

## 2020-03-23 PROCEDURE — 3077F PR MOST RECENT SYSTOLIC BLOOD PRESSURE >= 140 MM HG: ICD-10-PCS | Mod: CPTII,S$GLB,, | Performed by: PSYCHIATRY & NEUROLOGY

## 2020-03-23 PROCEDURE — 3078F PR MOST RECENT DIASTOLIC BLOOD PRESSURE < 80 MM HG: ICD-10-PCS | Mod: CPTII,S$GLB,, | Performed by: PSYCHIATRY & NEUROLOGY

## 2020-03-23 PROCEDURE — 1159F PR MEDICATION LIST DOCUMENTED IN MEDICAL RECORD: ICD-10-PCS | Mod: S$GLB,,, | Performed by: PSYCHIATRY & NEUROLOGY

## 2020-03-23 PROCEDURE — 1126F PR PAIN SEVERITY QUANTIFIED, NO PAIN PRESENT: ICD-10-PCS | Mod: S$GLB,,, | Performed by: PSYCHIATRY & NEUROLOGY

## 2020-03-23 PROCEDURE — 99215 PR OFFICE/OUTPT VISIT, EST, LEVL V, 40-54 MIN: ICD-10-PCS | Mod: S$GLB,,, | Performed by: PSYCHIATRY & NEUROLOGY

## 2020-03-23 PROCEDURE — 1159F MED LIST DOCD IN RCRD: CPT | Mod: S$GLB,,, | Performed by: PSYCHIATRY & NEUROLOGY

## 2020-03-23 NOTE — PROGRESS NOTES
HPI: Papo Chopra is a 84 y.o. male with HTN who presented to [facility] on 2/29/2020 with slurred speech,  disorientation, complaint of being wobbly on his feet.  The symptoms resolved in 15 minutes  The patient does not fully remember these events. Thought daughter was exaggerating and felt he had only mild symptoms. He remembers declining a fishing trip  because he was feeling bad  He remembers feeling tired but does not remember speech problems or disoriented  Event was thought to be TIA vs Seizure  EEG recommended outpatient.   MRI brain was negative in house  He has had no symptoms since.   He is on statin since being in house and tolerating this well.  He has followed up with PCP about this.  Labs followed by that provider  Not on ASA prior to event  Never had seizures in his life. Never had stroke prior and no family history. No head trauma or meningitis history     No memory problems.     He lives with his wife. He is retired.     Review of Systems   Constitutional: Negative for fever.   HENT: Negative for nosebleeds.    Eyes: Negative for double vision.   Respiratory: Negative for hemoptysis.    Cardiovascular: Negative for leg swelling.   Gastrointestinal: Negative for blood in stool.   Genitourinary: Negative for hematuria.   Musculoskeletal: Negative for falls.   Skin: Negative for rash.   Neurological: Negative for seizures.   Psychiatric/Behavioral: The patient does not have insomnia.          I have reviewed all of this patient's past medical and surgical histories as well as family and social histories and active allergies and medications as documented in the electronic medical record.        Exam:  Gen Appearance, well developed/nourished in no apparent distress  CV: 2+ distal pulses with no edema or swelling  Neuro:  MS: Awake, alert, oriented to place, person, time, situation. Sustains attention. Recent/remote memory intact, Language is full to spontaneous  speech/repetition/naming/comprehension. Fund of Knowledge is full  CN: Optic discs are flat with normal vasculature, PERRL, Extraoccular movements and visual fields are full. Normal facial sensation and strength, Hearing symmetric, Tongue and Palate are midline and strong. Shoulder Shrug symmetric and strong.  Motor: Normal bulk, tone, no abnormal movements. 5/5 strength bilateral upper/lower extremities with 2+ reflexes and bilateral plantar response  Sensory: symmetric to light touch, pain, temp, and vibration/proprioception. Romberg negative  Cerebellar: Finger-nose,Heal-shin, Rapid alternating movements intact  Gait: Normal stance, no ataxia    Imaging: 3/1/2020 MRI brain: No acute intracranial abnormalities identified.  No evidence of acute infarction.  3/1/2020 CTA head and neck: No acute abnormality.  3/1/2020 MRA brain and neck unremarkable    Labs: 3/2020 B12, RPR, folate, a1C, TSH  Reviewed, CMP, CBC reviewed  -mild elevation in A1C  LDL 96      · 3/2020 Echo: Normal left ventricular systolic function. The estimated ejection fraction is 65%.  · Concentric left ventricular remodeling.  · Normal LV diastolic function.  · Normal right ventricular systolic function.  · The sinuses of Valsalva is dilated.  Normal central venous pressure (3 mmHg).      Assessment/Plan: Papo Chopra is a 84 y.o. male with a history of HTN who presented in 2/2020 with slurred speech, word finding difficulties and some unsteadiness that resolved in 15 minutes. Pt may have been at least partially amnestic to this event- possible seizure vs. TIA.   I recommend:   1. Note: MRI negative for acute findings. CTA head and neck normal  - cont ASA 81 daily (started after event).  - Also continue  atorvastatin 40 mg (started in house). LDL LDL- 96.8, HDL- 37. Follow up labs with PCP as planned.   - Wqd0k-4.8, F/U with PCP, goal BG<140  - recommend EEG per orders. Would not start AEDs at this time unless EEG is abnormal. Consider   restrictions if EEG is abnormal  -event monitor to complete stroke work up per orders.   To the ER if any further spells.   RTC 3 months

## 2020-04-01 ENCOUNTER — CLINICAL SUPPORT (OUTPATIENT)
Dept: CARDIOLOGY | Facility: HOSPITAL | Age: 85
End: 2020-04-01
Attending: PSYCHIATRY & NEUROLOGY
Payer: MEDICARE

## 2020-04-01 DIAGNOSIS — R42 DIZZINESS AND GIDDINESS: ICD-10-CM

## 2020-04-01 DIAGNOSIS — R29.818 TRANSIENT NEUROLOGICAL SYMPTOMS: ICD-10-CM

## 2020-04-01 PROCEDURE — 93272 ECG/REVIEW INTERPRET ONLY: CPT | Mod: ,,, | Performed by: INTERNAL MEDICINE

## 2020-04-01 PROCEDURE — 93272 CARDIAC EVENT MONITOR (CUPID ONLY): ICD-10-PCS | Mod: ,,, | Performed by: INTERNAL MEDICINE

## 2020-04-01 PROCEDURE — 93271 ECG/MONITORING AND ANALYSIS: CPT

## 2020-06-11 ENCOUNTER — APPOINTMENT (OUTPATIENT)
Dept: NEUROLOGY | Facility: HOSPITAL | Age: 85
End: 2020-06-11
Attending: PSYCHIATRY & NEUROLOGY
Payer: MEDICARE

## 2020-06-11 PROCEDURE — 95816 EEG AWAKE AND DROWSY: CPT | Performed by: PSYCHIATRY & NEUROLOGY

## 2020-06-18 ENCOUNTER — TELEPHONE (OUTPATIENT)
Dept: NEUROLOGY | Facility: CLINIC | Age: 85
End: 2020-06-18

## 2025-03-28 NOTE — ASSESSMENT & PLAN NOTE
Medication: rOPINIRole (REQUIP) 2 MG tablet  passed protocol.   Last office visit date: 1/28/2025  Next appointment scheduled?: No;  not due    Number of refills given: 1    TIA (transient ischemic attack)  Slurred speech    -Continuous cardiac monitoring   -Troponin: WNL  -tele vascular consult complete and recommend workup for infection/metabolic disturbances. If no e/o of underlying condition, vessel imaging with CTA or MRA can be considered to r/o culprit of atheroembolism or focal hypoperfusion.Recommend aspirin 81 mg until workup completed.  -CT of head: no acute intra cranial processes  -MRI of head: pending  -MRA of head and Neck: pending  -LDL: 96.8  -U/A: negative for UTI  -TSH: WNL  -B-12, Folate, RPR: pending  -Permissive HTN  -2D echo: pending  -Neuro checks Q4hrs  -PT/OT/SP evaluation  3/1 stable, no deficit  Await MRI and CTA results  3/2 neg MRI  Dc plavix  Await cta and echo results   eeg as outpatient